# Patient Record
Sex: FEMALE | Race: WHITE | Employment: FULL TIME | URBAN - METROPOLITAN AREA
[De-identification: names, ages, dates, MRNs, and addresses within clinical notes are randomized per-mention and may not be internally consistent; named-entity substitution may affect disease eponyms.]

---

## 2019-07-19 PROBLEM — F33.2 DEPRESSION, MAJOR, SEVERE RECURRENCE (HCC): Status: ACTIVE | Noted: 2019-07-19

## 2020-06-15 PROBLEM — O09.529 ANTEPARTUM MULTIGRAVIDA OF ADVANCED MATERNAL AGE: Status: ACTIVE | Noted: 2020-06-15

## 2020-07-16 PROBLEM — F33.2 DEPRESSION, MAJOR, SEVERE RECURRENCE (HCC): Status: RESOLVED | Noted: 2019-07-19 | Resolved: 2020-07-16

## 2020-07-20 PROBLEM — O09.811 PREGNANCY RESULTING FROM ASSISTED REPRODUCTIVE TECHNOLOGY IN FIRST TRIMESTER: Status: ACTIVE | Noted: 2020-07-20

## 2020-09-02 PROBLEM — O09.812 PREGNANCY RESULTING FROM ASSISTED REPRODUCTIVE TECHNOLOGY IN SECOND TRIMESTER: Status: ACTIVE | Noted: 2020-07-20

## 2020-11-07 PROBLEM — O24.410 DIET CONTROLLED WHITE CLASSIFICATION A1 GESTATIONAL DIABETES MELLITUS (GDM): Status: ACTIVE | Noted: 2020-11-07

## 2020-11-16 ENCOUNTER — HOSPITAL ENCOUNTER (OUTPATIENT)
Dept: DIABETES SERVICES | Age: 35
Discharge: HOME | End: 2020-11-16
Payer: COMMERCIAL

## 2020-11-16 PROCEDURE — G0109 DIAB MANAGE TRN IND/GROUP: HCPCS

## 2020-11-16 NOTE — PROGRESS NOTES
This is a class  appointment with limited persons allowed in class due to SSOUI-72 public health emergency. Social distancing and mandatory precautions are in place and utilized. Gestational Diabetes Self-Management Support Plan    Services Provided: Pt received education on nutrition and meal planning during pregnancy. Emotional support for adjustment to diagnosis was provided. Care Plan/Recommendations:  Pt instructed to record blood sugar 4x/day and record all meals and snacks. Pt to bring information to appointments with [address] [place] [address] [facility]. Notes for Follow Up:   1. Barriers to checking blood glucose and adherence to meal plan identified: none  2. Barriers to psychological adjustment to diagnosis identified: none  3. Patient needs to be seen by [facility] ASAP due to: appointment is 11/18/20.       Brown Rajput, [address] [place] [address], LD, CDE  [facility] [address] [facility]

## 2020-12-15 PROBLEM — O09.813 PREGNANCY RESULTING FROM ASSISTED REPRODUCTIVE TECHNOLOGY IN THIRD TRIMESTER: Status: ACTIVE | Noted: 2020-07-20

## 2021-01-25 ENCOUNTER — HOSPITAL ENCOUNTER (INPATIENT)
Age: 36
LOS: 2 days | Discharge: HOME | End: 2021-01-27
Attending: OBSTETRICS & GYNECOLOGY | Admitting: OBSTETRICS & GYNECOLOGY
Payer: COMMERCIAL

## 2021-01-25 LAB
A1 MICROGLOB PLACENTAL VAG QL: NEGATIVE
ABO + RH BLD: NORMAL
BLOOD GROUP ANTIBODIES SERPL: NORMAL
CONTROL LINE PRESENT?: NORMAL
ERYTHROCYTE [DISTWIDTH] IN BLOOD BY AUTOMATED COUNT: 13.6 % (ref 11.9–14.6)
EXPIRATION DATE: NORMAL
GLUCOSE BLD STRIP.AUTO-MCNC: 105 MG/DL (ref 65–100)
GLUCOSE BLD STRIP.AUTO-MCNC: 127 MG/DL (ref 65–100)
GLUCOSE BLD STRIP.AUTO-MCNC: 81 MG/DL (ref 65–100)
HCT VFR BLD AUTO: 40.8 % (ref 35.8–46.3)
HGB BLD-MCNC: 13.4 G/DL (ref 11.7–15.4)
INTERNAL NEGATIVE CONTROL: NORMAL
KIT LOT NO.: NORMAL
MCH RBC QN AUTO: 26.9 PG (ref 26.1–32.9)
MCHC RBC AUTO-ENTMCNC: 32.8 G/DL (ref 31.4–35)
MCV RBC AUTO: 81.9 FL (ref 79.6–97.8)
NRBC # BLD: 0 K/UL (ref 0–0.2)
PLATELET # BLD AUTO: 240 K/UL (ref 150–450)
PMV BLD AUTO: 11.1 FL (ref 9.4–12.3)
RBC # BLD AUTO: 4.98 M/UL (ref 4.05–5.2)
SPECIMEN EXP DATE BLD: NORMAL
WBC # BLD AUTO: 9.2 K/UL (ref 4.3–11.1)

## 2021-01-25 PROCEDURE — 99283 EMERGENCY DEPT VISIT LOW MDM: CPT

## 2021-01-25 PROCEDURE — 74011250637 HC RX REV CODE- 250/637: Performed by: OBSTETRICS & GYNECOLOGY

## 2021-01-25 PROCEDURE — 86900 BLOOD TYPING SEROLOGIC ABO: CPT

## 2021-01-25 PROCEDURE — 85027 COMPLETE CBC AUTOMATED: CPT

## 2021-01-25 PROCEDURE — 82962 GLUCOSE BLOOD TEST: CPT

## 2021-01-25 PROCEDURE — 36415 COLL VENOUS BLD VENIPUNCTURE: CPT

## 2021-01-25 PROCEDURE — 65270000029 HC RM PRIVATE

## 2021-01-25 PROCEDURE — 84112 EVAL AMNIOTIC FLUID PROTEIN: CPT | Performed by: OBSTETRICS & GYNECOLOGY

## 2021-01-25 RX ORDER — DEXTROSE, SODIUM CHLORIDE, SODIUM LACTATE, POTASSIUM CHLORIDE, AND CALCIUM CHLORIDE 5; .6; .31; .03; .02 G/100ML; G/100ML; G/100ML; G/100ML; G/100ML
125 INJECTION, SOLUTION INTRAVENOUS CONTINUOUS
Status: DISCONTINUED | OUTPATIENT
Start: 2021-01-25 | End: 2021-01-27 | Stop reason: HOSPADM

## 2021-01-25 RX ORDER — SODIUM CHLORIDE 0.9 % (FLUSH) 0.9 %
5-40 SYRINGE (ML) INJECTION EVERY 8 HOURS
Status: DISCONTINUED | OUTPATIENT
Start: 2021-01-25 | End: 2021-01-26

## 2021-01-25 RX ORDER — ZOLPIDEM TARTRATE 5 MG/1
5 TABLET ORAL
Status: DISCONTINUED | OUTPATIENT
Start: 2021-01-25 | End: 2021-01-27 | Stop reason: HOSPADM

## 2021-01-25 RX ORDER — SODIUM CHLORIDE 0.9 % (FLUSH) 0.9 %
5-40 SYRINGE (ML) INJECTION AS NEEDED
Status: DISCONTINUED | OUTPATIENT
Start: 2021-01-25 | End: 2021-01-26

## 2021-01-25 RX ORDER — LIDOCAINE HYDROCHLORIDE 10 MG/ML
1 INJECTION INFILTRATION; PERINEURAL
Status: ACTIVE | OUTPATIENT
Start: 2021-01-25 | End: 2021-01-26

## 2021-01-25 RX ORDER — FAMOTIDINE 20 MG/1
20 TABLET, FILM COATED ORAL
Status: DISCONTINUED | OUTPATIENT
Start: 2021-01-25 | End: 2021-01-27 | Stop reason: HOSPADM

## 2021-01-25 RX ORDER — LIDOCAINE HYDROCHLORIDE 20 MG/ML
JELLY TOPICAL
Status: ACTIVE | OUTPATIENT
Start: 2021-01-25 | End: 2021-01-26

## 2021-01-25 RX ORDER — OXYTOCIN/RINGER'S LACTATE 30/500 ML
87.3 PLASTIC BAG, INJECTION (ML) INTRAVENOUS AS NEEDED
Status: DISCONTINUED | OUTPATIENT
Start: 2021-01-25 | End: 2021-01-27 | Stop reason: HOSPADM

## 2021-01-25 RX ORDER — SODIUM CHLORIDE 0.9 % (FLUSH) 0.9 %
5-40 SYRINGE (ML) INJECTION AS NEEDED
Status: DISCONTINUED | OUTPATIENT
Start: 2021-01-25 | End: 2021-01-27 | Stop reason: HOSPADM

## 2021-01-25 RX ORDER — ONDANSETRON 2 MG/ML
4 INJECTION INTRAMUSCULAR; INTRAVENOUS
Status: DISCONTINUED | OUTPATIENT
Start: 2021-01-25 | End: 2021-01-27 | Stop reason: HOSPADM

## 2021-01-25 RX ORDER — OXYTOCIN/RINGER'S LACTATE 30/500 ML
10 PLASTIC BAG, INJECTION (ML) INTRAVENOUS AS NEEDED
Status: COMPLETED | OUTPATIENT
Start: 2021-01-25 | End: 2021-01-26

## 2021-01-25 RX ORDER — MINERAL OIL
120 OIL (ML) ORAL
Status: COMPLETED | OUTPATIENT
Start: 2021-01-25 | End: 2021-01-26

## 2021-01-25 RX ORDER — BUTORPHANOL TARTRATE 2 MG/ML
1 INJECTION INTRAMUSCULAR; INTRAVENOUS
Status: DISCONTINUED | OUTPATIENT
Start: 2021-01-25 | End: 2021-01-27 | Stop reason: HOSPADM

## 2021-01-25 RX ADMIN — MISOPROSTOL 50 MCG: 100 TABLET ORAL at 23:46

## 2021-01-25 RX ADMIN — ZOLPIDEM TARTRATE 5 MG: 5 TABLET ORAL at 22:17

## 2021-01-25 RX ADMIN — MISOPROSTOL 25 MCG: 100 TABLET ORAL at 19:07

## 2021-01-25 NOTE — PROGRESS NOTES
Pt presents to [facility] with c/o SROM. Pt was sent over from the office to get Amnisure test performed.

## 2021-01-25 NOTE — H&P
Chief Complaint   Patient presents with    Leakage/Loss of Fluid       28 y.o. female  at 39w5d  weeks gestation who requests evaluation for possible srom. Had some leakage today, was seen in office and initially had negative nitrazine and was sent for [facility]. This was negative. But office slide for lillie did come back as positive. So given patient's GA and other factors will be admitted for induction tonight per d.dr velasquez. Her pregnancy issues include: ama, obesity, gdm diet controlled. Fetal movement has beennormal .    HISTORY:  OB History    Para Term  AB Living   1 0 0 0 0 0   SAB TAB Ectopic Molar Multiple Live Births   0 0 0 0 0 0      # Outcome Date GA Lbr Bao/2nd Weight Sex Delivery Anes PTL Lv   1 Current                No past surgical history on file.     Past Medical History:   Diagnosis Date    Anxiety     Cystic acne 2016    Depression 2016    Depression, major, severe recurrence ([facility] [address] 2019    Overweight (BMI 25.0-29.9) 2016    PCOS (polycystic ovarian syndrome)        No Known Allergies    Family History   Problem Relation Age of Onset    Hypertension Mother     No Known Problems Father     No Known Problems Sister     No Known Problems Brother     Cancer Maternal Grandfather     Diabetes Neg Hx     Heart Disease Neg Hx     Stroke Neg Hx        Social History     Socioeconomic History    Marital status:      Spouse name: Not on file    Number of children: Not on file    Years of education: Not on file    Highest education level: Not on file   Occupational History    Not on file   Social Needs    Financial resource strain: Not on file    Food insecurity     Worry: Not on file     Inability: Not on file    Transportation needs     Medical: Not on file     Non-medical: Not on file   Tobacco Use    Smoking status: Former Smoker     Quit date: 2017     Years since quitting: 3.6    Smokeless tobacco: Never Used   Rain Tobacco comment: one pack a month   Substance and Sexual Activity    Alcohol use: Not Currently    Drug use: No    Sexual activity: Yes     Partners: Male     Birth control/protection: None   Lifestyle    Physical activity     Days per week: Not on file     Minutes per session: Not on file    Stress: Not on file   Relationships    Social connections     Talks on phone: Not on file     Gets together: Not on file     Attends Yazidi service: Not on file     Active member of club or organization: Not on file     Attends meetings of clubs or organizations: Not on file     Relationship status: Not on file    Intimate partner violence     Fear of current or ex partner: Not on file     Emotionally abused: Not on file     Physically abused: Not on file     Forced sexual activity: Not on file   Other Topics Concern    Not on file   Social History Narrative    Lives at home , Ai Gonzalez ROS:  Negative:   negative 10 point ROS except as noted in HPI    Positive:   per hpi    PHYSICAL EXAM:  Last menstrual period 04/22/2020. The patient appears well, alert, oriented x 3. Appropriate affect. Lungs are clear. Heart RRR, no murmurs. Abdomen soft, non-tender, no rebound/guarding, normoactive bs. Fundus soft and non tender  Skin warm, dry, no rashes  Ext no edema, DTR's normal    Cervix: deferred/ was 1 cm in office. Fetal Heart Rate: Reactive  Baseline: 120 per minute  Variability: moderate  Accelerations: yes  Decelerations: none  Uterine contractions: none   I personally reviewed and interpreted the FHR tracing    Tests performed and reviwed:  Amniosure:  negative    I have personally reviewed the patient's history, prenatal record, and pertinent test results. vital sign trends, laboratory results, previous provider notes support my clinical impression. Assessment:  28 y.o. female at 39w5d  39.5 weeks with gdm A1, obesity, ama, possible srom.      Plan:  Findings and test results were discussed with patient.    Admit for cytotech induction-per dr. Sigifredo Richards By:  Teri Reza MD     January 25, 2021

## 2021-01-25 NOTE — PROGRESS NOTES
Pt admitted for cytotec induction tonight. Consents signed, NST reactive and admission completed. Pt verbalizes understanding of plan of care.

## 2021-01-26 ENCOUNTER — ANESTHESIA (OUTPATIENT)
Dept: LABOR AND DELIVERY | Age: 36
End: 2021-01-26
Payer: COMMERCIAL

## 2021-01-26 ENCOUNTER — ANESTHESIA EVENT (OUTPATIENT)
Dept: LABOR AND DELIVERY | Age: 36
End: 2021-01-26
Payer: COMMERCIAL

## 2021-01-26 LAB
ARTERIAL PATENCY WRIST A: ABNORMAL
ARTERIAL PATENCY WRIST A: ABNORMAL
BASE DEFICIT BLD-SCNC: 6 MMOL/L
BASE DEFICIT BLD-SCNC: 6 MMOL/L
BDY SITE: ABNORMAL
BDY SITE: ABNORMAL
CO2 BLD-SCNC: 23 MMOL/L
CO2 BLD-SCNC: 26 MMOL/L
COLLECT TIME,HTIME: 927
COLLECT TIME,HTIME: 927
GAS FLOW.O2 O2 DELIVERY SYS: ABNORMAL L/MIN
GAS FLOW.O2 O2 DELIVERY SYS: ABNORMAL L/MIN
GLUCOSE BLD STRIP.AUTO-MCNC: 119 MG/DL (ref 65–100)
HCO3 BLD-SCNC: 24.2 MMOL/L (ref 22–26)
HCO3 BLDV-SCNC: 21.8 MMOL/L (ref 23–28)
PCO2 BLDCO: 50 MMHG (ref 32–68)
PCO2 BLDCO: 65 MMHG (ref 32–68)
PH BLDCO: 7.18 [PH] (ref 7.15–7.38)
PH BLDCO: 7.25 [PH] (ref 7.15–7.38)
PO2 BLDCO: 14 MMHG
PO2 BLDCO: 24 MMHG
SAO2 % BLD: 11 % (ref 95–98)
SAO2 % BLDV: 34 % (ref 65–88)
SERVICE CMNT-IMP: ABNORMAL
SERVICE CMNT-IMP: ABNORMAL
SPECIMEN TYPE: ABNORMAL
SPECIMEN TYPE: ABNORMAL

## 2021-01-26 PROCEDURE — 76060000078 HC EPIDURAL ANESTHESIA

## 2021-01-26 PROCEDURE — 75410000003 HC RECOV DEL/VAG/CSECN EA 0.5 HR

## 2021-01-26 PROCEDURE — 77030018846 HC SOL IRR STRL H20 ICUM -A

## 2021-01-26 PROCEDURE — 74011250637 HC RX REV CODE- 250/637: Performed by: OBSTETRICS & GYNECOLOGY

## 2021-01-26 PROCEDURE — 82962 GLUCOSE BLOOD TEST: CPT

## 2021-01-26 PROCEDURE — 82803 BLOOD GASES ANY COMBINATION: CPT

## 2021-01-26 PROCEDURE — 75410000002 HC LABOR FEE PER 1 HR

## 2021-01-26 PROCEDURE — 74011250636 HC RX REV CODE- 250/636: Performed by: OBSTETRICS & GYNECOLOGY

## 2021-01-26 PROCEDURE — A4300 CATH IMPL VASC ACCESS PORTAL: HCPCS | Performed by: NURSE ANESTHETIST, CERTIFIED REGISTERED

## 2021-01-26 PROCEDURE — 3E0P7VZ INTRODUCTION OF HORMONE INTO FEMALE REPRODUCTIVE, VIA NATURAL OR ARTIFICIAL OPENING: ICD-10-PCS | Performed by: OBSTETRICS & GYNECOLOGY

## 2021-01-26 PROCEDURE — 74011250636 HC RX REV CODE- 250/636: Performed by: NURSE ANESTHETIST, CERTIFIED REGISTERED

## 2021-01-26 PROCEDURE — 77010026065 HC OXYGEN MINIMUM MEDICAL AIR

## 2021-01-26 PROCEDURE — 77030003028 HC SUT VCRL J&J -A

## 2021-01-26 PROCEDURE — 75410000000 HC DELIVERY VAGINAL/SINGLE

## 2021-01-26 PROCEDURE — 74011250636 HC RX REV CODE- 250/636: Performed by: REGISTERED NURSE

## 2021-01-26 PROCEDURE — 0UQGXZZ REPAIR VAGINA, EXTERNAL APPROACH: ICD-10-PCS | Performed by: OBSTETRICS & GYNECOLOGY

## 2021-01-26 PROCEDURE — 74011000250 HC RX REV CODE- 250: Performed by: STUDENT IN AN ORGANIZED HEALTH CARE EDUCATION/TRAINING PROGRAM

## 2021-01-26 PROCEDURE — 65270000029 HC RM PRIVATE

## 2021-01-26 PROCEDURE — 77030014125 HC TY EPDRL BBMI -B: Performed by: NURSE ANESTHETIST, CERTIFIED REGISTERED

## 2021-01-26 RX ORDER — ROPIVACAINE HYDROCHLORIDE 2 MG/ML
INJECTION, SOLUTION EPIDURAL; INFILTRATION; PERINEURAL AS NEEDED
Status: DISCONTINUED | OUTPATIENT
Start: 2021-01-26 | End: 2021-01-26 | Stop reason: HOSPADM

## 2021-01-26 RX ORDER — DOCUSATE SODIUM 100 MG/1
100 CAPSULE, LIQUID FILLED ORAL 2 TIMES DAILY
Status: DISCONTINUED | OUTPATIENT
Start: 2021-01-26 | End: 2021-01-27 | Stop reason: HOSPADM

## 2021-01-26 RX ORDER — NALOXONE HYDROCHLORIDE 0.4 MG/ML
0.4 INJECTION, SOLUTION INTRAMUSCULAR; INTRAVENOUS; SUBCUTANEOUS AS NEEDED
Status: DISCONTINUED | OUTPATIENT
Start: 2021-01-26 | End: 2021-01-27 | Stop reason: HOSPADM

## 2021-01-26 RX ORDER — SIMETHICONE 80 MG
80 TABLET,CHEWABLE ORAL
Status: DISCONTINUED | OUTPATIENT
Start: 2021-01-26 | End: 2021-01-27 | Stop reason: HOSPADM

## 2021-01-26 RX ORDER — ROPIVACAINE HYDROCHLORIDE 2 MG/ML
INJECTION, SOLUTION EPIDURAL; INFILTRATION; PERINEURAL
Status: DISCONTINUED | OUTPATIENT
Start: 2021-01-26 | End: 2021-01-26 | Stop reason: HOSPADM

## 2021-01-26 RX ORDER — CITALOPRAM 20 MG/1
20 TABLET, FILM COATED ORAL DAILY
Status: DISCONTINUED | OUTPATIENT
Start: 2021-01-26 | End: 2021-01-27 | Stop reason: HOSPADM

## 2021-01-26 RX ORDER — HYDROCODONE BITARTRATE AND ACETAMINOPHEN 5; 325 MG/1; MG/1
1-2 TABLET ORAL
Status: DISCONTINUED | OUTPATIENT
Start: 2021-01-26 | End: 2021-01-27 | Stop reason: HOSPADM

## 2021-01-26 RX ORDER — LIDOCAINE HYDROCHLORIDE AND EPINEPHRINE 15; 5 MG/ML; UG/ML
INJECTION, SOLUTION EPIDURAL
Status: COMPLETED | OUTPATIENT
Start: 2021-01-26 | End: 2021-01-26

## 2021-01-26 RX ORDER — DIPHENHYDRAMINE HCL 25 MG
25-50 CAPSULE ORAL
Status: DISCONTINUED | OUTPATIENT
Start: 2021-01-26 | End: 2021-01-27 | Stop reason: HOSPADM

## 2021-01-26 RX ORDER — OXYTOCIN/RINGER'S LACTATE 30/500 ML
0-20 PLASTIC BAG, INJECTION (ML) INTRAVENOUS
Status: DISCONTINUED | OUTPATIENT
Start: 2021-01-26 | End: 2021-01-27 | Stop reason: HOSPADM

## 2021-01-26 RX ORDER — FAMOTIDINE 20 MG/1
40 TABLET, FILM COATED ORAL DAILY
Status: DISCONTINUED | OUTPATIENT
Start: 2021-01-26 | End: 2021-01-27 | Stop reason: HOSPADM

## 2021-01-26 RX ORDER — IBUPROFEN 800 MG/1
800 TABLET ORAL
Status: DISCONTINUED | OUTPATIENT
Start: 2021-01-26 | End: 2021-01-27 | Stop reason: HOSPADM

## 2021-01-26 RX ORDER — ZOLPIDEM TARTRATE 5 MG/1
10 TABLET ORAL
Status: DISCONTINUED | OUTPATIENT
Start: 2021-01-26 | End: 2021-01-26 | Stop reason: SDUPTHER

## 2021-01-26 RX ADMIN — Medication 10000 MILLI-UNITS: at 09:34

## 2021-01-26 RX ADMIN — DOCUSATE SODIUM 100 MG: 100 CAPSULE ORAL at 11:59

## 2021-01-26 RX ADMIN — IBUPROFEN 800 MG: 800 TABLET ORAL at 18:47

## 2021-01-26 RX ADMIN — ROPIVACAINE HYDROCHLORIDE 8.5 ML/HR: 2 INJECTION, SOLUTION EPIDURAL; INFILTRATION at 03:08

## 2021-01-26 RX ADMIN — BUTORPHANOL TARTRATE 1 MG: 2 INJECTION, SOLUTION INTRAMUSCULAR; INTRAVENOUS at 01:42

## 2021-01-26 RX ADMIN — ROPIVACAINE HYDROCHLORIDE 6 ML: 5 INJECTION, SOLUTION EPIDURAL; INFILTRATION; PERINEURAL at 09:15

## 2021-01-26 RX ADMIN — Medication 4 ML: at 03:07

## 2021-01-26 RX ADMIN — IBUPROFEN 800 MG: 800 TABLET ORAL at 11:59

## 2021-01-26 RX ADMIN — FAMOTIDINE 40 MG: 20 TABLET, FILM COATED ORAL at 11:59

## 2021-01-26 RX ADMIN — LIDOCAINE HYDROCHLORIDE,EPINEPHRINE BITARTRATE 3 ML: 15; .005 INJECTION, SOLUTION EPIDURAL; INFILTRATION; INTRACAUDAL; PERINEURAL at 02:48

## 2021-01-26 RX ADMIN — SODIUM CHLORIDE, SODIUM LACTATE, POTASSIUM CHLORIDE, CALCIUM CHLORIDE, AND DEXTROSE MONOHYDRATE 125 ML/HR: 600; 310; 30; 20; 5 INJECTION, SOLUTION INTRAVENOUS at 03:06

## 2021-01-26 RX ADMIN — SODIUM CHLORIDE, SODIUM LACTATE, POTASSIUM CHLORIDE, AND CALCIUM CHLORIDE 500 ML: 600; 310; 30; 20 INJECTION, SOLUTION INTRAVENOUS at 02:40

## 2021-01-26 RX ADMIN — Medication 4 ML: at 03:03

## 2021-01-26 RX ADMIN — DOCUSATE SODIUM 100 MG: 100 CAPSULE ORAL at 18:47

## 2021-01-26 RX ADMIN — FENTANYL CITRATE 100 MCG: 50 INJECTION INTRAMUSCULAR; INTRAVENOUS at 09:15

## 2021-01-26 RX ADMIN — MINERAL OIL 120 ML: 1000 LIQUID ORAL at 09:17

## 2021-01-26 RX ADMIN — ZOLPIDEM TARTRATE 5 MG: 5 TABLET ORAL at 21:06

## 2021-01-26 RX ADMIN — CITALOPRAM HYDROBROMIDE 20 MG: 20 TABLET ORAL at 13:59

## 2021-01-26 NOTE — PROGRESS NOTES
Call placed to Dr Vijay Landeros, informed md of pt receiving 2 doses of cytotec, SROM, epidural, and now 9/90/-1. No new orders received.

## 2021-01-26 NOTE — L&D DELIVERY NOTE
Delivery Summary    Patient: Edilia Carranza MRN: [id number]  SSN: [id number]    YOB: 1985  Age: 28 y.o.   Sex: female       Information for the patient's :  Haritah Alvarado [[id number]]       Labor Events:    Labor: No    Steroids: None   Cervical Ripening Date/Time: 2021 7:07 PM   Cervical Ripening Type: Misoprostol   Antibiotics During Labor: No   Rupture Identifier:      Rupture Date/Time: 2021 1:30 AM   Rupture Type: SROM   Amniotic Fluid Volume: Large    Amniotic Fluid Description: Clear    Amniotic Fluid Odor: None    Induction: None       Induction Date/Time:        Indications for Induction:      Augmentation: Oxytocin   Augmentation Date/Time:      Indications for Augmentation:     Labor complications: None       Additional complications:        Delivery Events:  Indications For Episiotomy:     Episiotomy:     Perineal Laceration(s):     Repaired:     Periurethral Laceration Location:      Repaired:     Labial Laceration Location:     Repaired:     Sulcal Laceration Location:     Repaired:     Vaginal Laceration Location:     Repaired:     Cervical Laceration Location:     Repaired:     Repair Suture: Vicryl 3-0   Number of Repair Packets:     Estimated Blood Loss (ml):  ml   Quantitative Blood Loss (ml)                Delivery Date: 2021    Delivery Time: 9:27 AM  Delivery Type: Vaginal, Spontaneous  Sex:  Female    Gestational Age: 37w11d   Delivery Clinician:  Sin Marmolejo  Living Status: Living   Del[facility]          APGARS  One minute Five minutes Ten minutes   Skin color: 0   1        Heart rate: 2   2        Grimace: 2   2        Muscle tone: 2   2        Breathin   2        Totals: 8   9            Presentation: Vertex    Position:   Occiput Anterior  Resuscitation Method:  Tactile Stimulation;Suctioning-bulb     Meconium Stained:        Cord Information: 3 Vessels  Complications: Nuchal Cord Without Compressions  Cord around: right upper extremity  Delayed cord clamping? Yes  Cord clamped date/time:   Disposition of Cord Blood: Lab    Blood Gases Sent?: Yes    Placenta:  Date/Time: 2021  9:40 AM  Removal: Spontaneous      Appearance: Normal;Intact      Measurements:  Birth Weight: 6 lb 10.5 oz (3.02 kg)      Birth Length: 51 cm      Head Circumference: 36 cm      Chest Circumference: 30 cm     Abdominal Girth: Other Providers:   Fabrice Pugh A;ANGELIA CONNELLY;ASPEN PATEL;;;;;CATRACHITA STEWART;MJ POST, Obstetrician;Primary Nurse;Primary Kiel Nurse;Nicu Nurse;Neonatologist;Anesthesiologist;Crna;Scrub Tech;Charge Nurse           Group B Strep: No results found for: GRBSEXT, GRBSEXT  Information for the patient's :  Nathan Leon [[id number]]   No results found for: ABORH, PCTABR, PCTDIG, BILI, ABORHEXT, ABORH     Recent Labs     21  0950   PCO2CB 65   PO2CB 14   HCO3I 24.2   SO2I 11*   IBD 6   SPECTI ARTERIAL CORD   PHICB 7.18   ISITE CORD   IDEV OTHER   IALLEN NOT APPLICABLE      Delivery Note      No results found for: APH, APCO2, APO2, AHCO3, ABEC, ABDC, O2ST, SITE, RSCOM         No results found for: RUBELLAEXT, GRBSEXT         Procedure:  Patient became completely dilated and pushed. Episiotomy was not performed. Patient delivered head. Mouth and nares suctioned on the perineum with bulb syringe. Shoulders delivered without any evidence of dystocia. Baby delivered in the bed, was dried. The cord was clamped and cut. Cord pH and cord blood was obtained. The baby was placed on mom in a dry blanket or towel. The perineum was examined. She had a left vaginal tear repaired with 3.0 vicryl  The placenta was delivered spontaneously. It was examined. It was intact with three vessels. Mom and baby are doing well.          Naheed Craig MD  2021  10:03 AM

## 2021-01-26 NOTE — LACTATION NOTE
This note was copied from a baby's chart. Lactation visit. Mom had requested lactation. Baby born this morning. Has not yet latched. Mom GDM, baby with stable blood glucose. Baby roused to attempt to feed. Reviewed waking measures and discussed feeding cues. Suck assessed- baby tongue thrusting and disorganized, showed some mild improvement with a few minutes of suck practice. Discussed with parents, encouraged suck practice prior to latch attempt. Assisted on both breasts, football hold. Large breast, left nipple everted, right short and does dimple back with compression. No latch to either breast. Baby still only a few hours old so discussed options with mom. Mom desires to pump, wants to use own Spectra pump in room. Pump set up with full instruction on use, parts and settings. Mom prefers to pump one breast at a time. Reviewed collection of colostrum and Dad fed 1.5ml back to baby via straight syringe. Did well. Reviewed first 24 hour expectations. Watch for feeding cues. Feed on demand. Attempt at all feeds. Pump if no latch, and feed back colostrum. LC to continue to assist, may take baby some time to learn to latch well.

## 2021-01-26 NOTE — ANESTHESIA PROCEDURE NOTES
Epidural Block    Patient location during procedure: OB  Start time: 1/26/2021 2:48 AM  End time: 1/26/2021 2:58 AM  Reason for block: labor epidural  Staffing  Performed: attending   Anesthesiologist: Medardo Chung MD  Preanesthetic Checklist  Completed: patient identified, risks and benefits discussed, surgical consent, pre-op evaluation and timeout performed  Block Placement  Patient position: sitting  Prep: ChloraPrep  Sterility prep: cap, drape, gloves, hand and mask  Sedation level: no sedation  Patient monitoring: continuous pulse oximetry and heart rate  Approach: midline  Location: lumbar  Lumbar location: L3-L4  Epidural  Loss of resistance technique: saline  Guidance: landmark technique  Needle  Needle type: Tuohy   Needle gauge: 17 G  Needle length: 10 cm  Needle insertion depth: 9 cm  Catheter type: end hole  Catheter size: 19 G  Catheter at skin depth: 15 cm  Catheter securement method: clear occlusive dressing and surgical tape  Test dose: negative  Medications Administered  Lidocaine-EPINEPHrine (XYLOCAINE) 1.5 %-1:200,000 Epidural, 3 mL  Assessment  Block outcome: pain improved  Number of attempts: 1  Procedure assessment: patient tolerated procedure well with no complications

## 2021-01-26 NOTE — PROGRESS NOTES
Pt states stadol has barely helped her pain, sve done 3/80/-2, pt requesting epidural at this time. LR bolusing and anesthesia notified.

## 2021-01-26 NOTE — PROGRESS NOTES
Nutrition:  Best Practice Alert received for GDM. Diet: DIET NPO With Ice Chips    Patient is admitted for induction. Will defer assessment as per standard of care.     Electronically signed by Annalisa Venegas MS, RDN, LD 1/26/2021 at 9:26 AM  Contact: [phone]

## 2021-01-26 NOTE — LACTATION NOTE

## 2021-01-26 NOTE — PROGRESS NOTES
Joshua Knox at bedside at [address] [place] [address]. SHANON Lees at bedside at [address] pt to sitting up on bedside at 0247. Timeout completed at [phone] with MD, SHANON and myself at bedside. Test dose given at 0258. Negative reaction. Pt assisted to lying back in left tilt position. See anesthesia record for details. See vital sign flow sheet for BP. Tolerated procedure well.

## 2021-01-26 NOTE — ANESTHESIA PREPROCEDURE EVALUATION
Relevant Problems   No relevant active problems       Anesthetic History   No history of anesthetic complications            Review of Systems / Medical History  Patient summary reviewed, nursing notes reviewed and pertinent labs reviewed    Pulmonary  Within defined limits                 Neuro/Psych         Psychiatric history     Cardiovascular  Within defined limits                Exercise tolerance: >4 METS     GI/Hepatic/Renal     GERD: well controlled           Endo/Other    Diabetes (gdm)    Obesity     Other Findings   Comments: iup         Physical Exam    Airway  Mallampati: II  TM Distance: 4 - 6 cm  Neck ROM: normal range of motion   Mouth opening: Normal     Cardiovascular  Regular rate and rhythm,  S1 and S2 normal,  no murmur, click, rub, or gallop             Dental  No notable dental hx       Pulmonary  Breath sounds clear to auscultation               Abdominal         Other Findings            Anesthetic Plan    ASA: 2  Anesthesia type: epidural            Anesthetic plan and risks discussed with: Patient and Spouse

## 2021-01-26 NOTE — PROGRESS NOTES
Pt grossly ruptured in bed, large amount of clear fluid noted. Bed linens changed, sve done 2/70/-2. Pt given stadol for pain.

## 2021-01-26 NOTE — PROGRESS NOTES
Dr Grant Orlando called and was updated on SVE 9cm/90/-1. Orders received to discontinue blood sugars. GABRIELLE (acute kidney injury)

## 2021-01-27 VITALS
RESPIRATION RATE: 18 BRPM | DIASTOLIC BLOOD PRESSURE: 85 MMHG | HEART RATE: 76 BPM | SYSTOLIC BLOOD PRESSURE: 139 MMHG | TEMPERATURE: 97.7 F

## 2021-01-27 PROCEDURE — 74011250637 HC RX REV CODE- 250/637: Performed by: OBSTETRICS & GYNECOLOGY

## 2021-01-27 RX ORDER — IBUPROFEN 600 MG/1
600 TABLET ORAL
Qty: 60 TAB | Refills: 1 | Status: SHIPPED | OUTPATIENT
Start: 2021-01-27

## 2021-01-27 RX ADMIN — FAMOTIDINE 40 MG: 20 TABLET, FILM COATED ORAL at 08:37

## 2021-01-27 RX ADMIN — DOCUSATE SODIUM 100 MG: 100 CAPSULE ORAL at 08:37

## 2021-01-27 RX ADMIN — IBUPROFEN 800 MG: 800 TABLET ORAL at 08:37

## 2021-01-27 RX ADMIN — IBUPROFEN 800 MG: 800 TABLET ORAL at 02:20

## 2021-01-27 RX ADMIN — CITALOPRAM HYDROBROMIDE 20 MG: 20 TABLET ORAL at 08:37

## 2021-01-27 NOTE — LACTATION NOTE
This note was copied from a baby's chart. Individualized Feeding Plan for Breastfeeding   Lactation Services [phone]      As much as possible, hold your baby on your chest so babys bare skin is against your bare skin with a blanket covering babys back, especially 30 minutes before it is time for baby to eat. Watch for early feeding cues such as, licking lips, sucking motions, rooting, hands to mouth. Crying is a late feeding cue. Feed your baby at least 8 times in 24 hours, or more if your baby is showing feeding cues. If baby is sleepy put baby skin to skin and watch for hunger cues. To rouse baby: unwrap, undress, massage hands, feet, & back, change diaper, gently change babys position from lying to sitting. 15-20 minutes on the first breast of active breastfeeding is considered a good feeding. Good, active breastfeeding is when baby is alert, tugging the nipple, their ear may move, and you can hear swallows. Allow baby to finish the first side before changing sides. Sleeping at the breast or only brief, light sucks should not be considered a good, full breastfeed. At each feeding:  __x__1. Do Suck Practice on finger before each feeding until sucking pattern is smooth. Try using index finger. Nail down towards tongue. __x__2. Hand Express for a few minutes prior to latching to help start milk flow. __x__3. Baby needs to NURSE WELL x 15-20 minutes on at least first breast, burp and offer 2nd breast at every feeding. If no sustained latch only attempt at breast for 10 minutes. If baby does not latch on and feed well on at least one side, you should:   __x__4. Double pump for 15 minutes with breast massage and compression. Hand express for an additional 2-3 minutes per side. Pump after each feeding attempt or poor feeding, up to 8 times per day. If you are not putting baby to the breast you need to pump 8 times a day. Pump every 3 hours. __x__5.  Give baby all of the breast milk you obtain using a straight syringe or  curved syringe. If baby does NOT have enough wet and dirty diapers per day, is jaundiced/lethargic, or has significant weight loss AND you do NOT pump enough milk for each feeding (per volume listed below), formula supplementation may need to be used. Call lactation department /pediatrician if you have concerns. AVERAGE INTAKES OF COLOSTRUM BY HEALTHY  INFANTS:  Time  Day Intake (ml per feeding)  Based on 8 feedings per day. 1st 24 hrs  1 2-10 ml  24-48 hrs  2 5-15 ml  (Based on every 3 hour feed)  48-72 hrs  3 15-30 ml (0.5-1 oz)  72-96 hrs  4 30-45 ml (1-1.5oz)                          5-6       45-60 ml (1.5-2oz)                           7          60-75 ml (2-2.5oz)    By day 7, baby will need 67 ml or 60 oz at each feeding based on 8 feedings per day & babys weight. (1oz = 30ml). Total milk volume needed in 24 hours by Day 7 is 17.8 oz per day based on baby's birthweight of 6lbs 11oz. The more often baby eats, the less volume they need per feeding. If baby is eating more often than the minimum of 8 times per day, they may take less per feeding. If pumping, suggest using olive oil or coconut oil on your nipples before pumping to help reduce the friction. Use feeding plan until follow up with pediatrician. Continue to attempt at the breast for most feeds. Pump every 3 hours if no latch. Give all pumped colostrum/breastmilk at each feeding. Mom and infant are following up with [facility] and will see lactation consultant there. Outpatient services are located on the 4th floor at [facility]. Check in at the 4th floor registration desk (the same one you used when you came to have your baby).   Call for questions [phone]

## 2021-01-27 NOTE — PROGRESS NOTES
Chart reviewed - first time parent; anxiety. SW met with patient/ while social distancing w/appropriate PPE.  provided education on [facility] [place] [facility].  Family declined referral for home visit. Patient confirms experiencing anxiety in the past, but she states that her moods have been appropriate during her pregnancy. Per patient, \"I feel great! \"  Patient is currently taking Celexa and has been on this medication \"for years. \"  She plans to remain on the Celexa postpartum. Patient given informational packet on  mood & anxiety disorders (resources/education). Family denies any additional needs from  at this time. Family has 's contact information should any needs/questions arise.     RACHEL Ernandez-MICHELLE  [address] [place] [address]   [phone]

## 2021-01-27 NOTE — LACTATION NOTE
In to follow up with mom and infant prior to discharge to home. Mom stated that she has continued to offer the breast but infant has continued to be sleepy and uninterested. She pumps after offering the breast and is feeding expressed colostrum to infant. She had just expressed 2 mls. Reviewed expectations of the second night of life as well discharge information. Feeding plan given to mom and reviewed. Mom has her breast pump that she has been using and will continue to use at home. Mom and infant are following up with [facility] and will see lactation consultant there.

## 2021-01-27 NOTE — DISCHARGE INSTRUCTIONS
Patient Education        Vaginal Childbirth: Care Instructions  Overview     Vaginal birth means delivering a baby through the birth canal (vagina). During labor, the uterus tightens (contracts) regularly to thin and open the cervix and to push the baby out through the birth canal.  Your body will slowly heal in the next few weeks. It's easy to get too tired and overwhelmed during the first weeks after your baby is born. Changes in your hormones can shift your mood without warning. You may find it hard to meet the extra demands on your energy and time. Take it easy on yourself. Follow-up care is a key part of your treatment and safety. Be sure to make and go to all appointments, and call your doctor if you are having problems. It's also a good idea to know your test results and keep a list of the medicines you take. How can you care for yourself at home? Vaginal bleeding and cramps  · After delivery, you will have a bloody discharge from your vagina. This will turn pink within a week and then white or yellow after about 10 days. It may last for 2 to 4 weeks or longer, until the uterus has healed. Use pads instead of tampons until you stop bleeding. · Don't worry if you pass some blood clots, as long as they are smaller than a golf ball. If you have a tear or stitches in your vaginal area, change the pad at least every 4 hours. This will help prevent soreness and infection. · You may have cramps for the first few days after childbirth. These are normal and occur as the uterus shrinks to normal size. Take an over-the-counter pain medicine, such as acetaminophen (Tylenol), ibuprofen (Advil, Motrin), or naproxen (Aleve), for cramps. Read and follow all instructions on the label. Do not take aspirin, because it can cause more bleeding. · Do not take two or more pain medicines at the same time unless the doctor told you to. Many pain medicines have acetaminophen, which is Tylenol.  Too much acetaminophen (Tylenol) can be harmful. Stitches  · If you have stitches, they will dissolve on their own and don't need to be removed. Follow your doctor's instructions for cleaning the stitched area. · Put ice or a cold pack on your painful area for 10 to 20 minutes at a time, several times a day, for the first few days. Put a thin cloth between the ice and your skin. · Sit in a few inches of warm water (sitz bath) 3 times a day and after bowel movements. The warm water helps with pain and itching. If you don't have a tub, a warm shower might help. Breast fullness  · Your breasts may overfill (engorge) in the first few days after delivery. To help milk flow and to relieve pain, warm your breasts in the shower or by using warm, moist towels before nursing. · If you aren't nursing, don't put warmth on your breasts or touch your breasts. Wear a tight bra or sports bra and use ice until the fullness goes away. This usually takes 2 to 3 days. · Put ice or a cold pack on your breast after nursing to reduce swelling and pain. Put a thin cloth between the ice and your skin. Activity  · Eat a balanced diet. Don't try to lose weight by cutting calories. Keep taking your prenatal vitamins, or take a multivitamin. · Get as much rest as you can. Try to take naps when your baby sleeps during the day. · Get some exercise every day. But don't do any heavy exercise until your doctor says it is okay. · Wait until you are healed (about 4 to 6 weeks) before you have sexual intercourse. Your doctor will tell you when it is okay to have sex. · Talk to your doctor about birth control. You can get pregnant even before your period returns. Also, you can get pregnant while you are breastfeeding. Mental health  · It's normal to have some sadness, anxiety, sleeplessness, and mood swings after you go home. If you feel upset or hopeless for more than a few days or are having trouble doing the things you need to do, talk to your doctor.   Constipation and hemorrhoids  · Drink plenty of fluids, enough so that your urine is light yellow or clear like water. If you have kidney, heart, or liver disease and have to limit fluids, talk with your doctor before you increase the amount of fluids you drink. · Eat plenty of fiber each day. Have a bran muffin or bran cereal for breakfast. Try eating a piece of fruit for a mid-afternoon snack. · For painful, itchy hemorrhoids, put ice or a cold pack on the area several times a day for 10 minutes at a time. Follow this by putting a warm compress on the area for another 10 to 20 minutes or by sitting in a shallow, warm bath. When should you call for help? Call  911 anytime you think you may need emergency care. For example, call if:    · You have thoughts of harming yourself, your baby, or another person.     · You passed out (lost consciousness).     · You have chest pain, are short of breath, or cough up blood.     · You have a seizure. Call your doctor now or seek immediate medical care if:    · You have severe vaginal bleeding.     · You are dizzy or lightheaded, or you feel like you may faint.     · You have a fever.     · You have new or more pain in your belly or pelvis.     · You have symptoms of a blood clot in your leg (called a deep vein thrombosis), such as:  ? Pain in the calf, back of the knee, thigh, or groin. ? Redness and swelling in your leg or groin.     · You have signs of preeclampsia, such as:  ? Sudden swelling of your face, hands, or feet. ? New vision problems (such as dimness, blurring, or seeing spots). ? A severe headache. Watch closely for changes in your health, and be sure to contact your doctor if:    · Your vaginal bleeding seems to be getting heavier.     · You have new or worse vaginal discharge.     · You feel sad, anxious, or hopeless for more than a few days.     · You do not get better as expected. Where can you learn more?   Go to http://www.gray.com/  Enter D159 in the search box to learn more about \"Vaginal Childbirth: Care Instructions. \"  Current as of: February 11, 2020               Content Version: 12.6  © 0847-7063 [employer]. Care instructions adapted under license by [employer] (which disclaims liability or warranty for this information). If you have questions about a medical condition or this instruction, always ask your healthcare professional. [place] [address] any warranty or liability for your use of this information.

## 2021-01-27 NOTE — PROGRESS NOTES
0830 AM assessment as documented. Medications per MAR. ICE packs and pads provided. Pt up in room and doing well. Request discharge today. Instructed to call with needs.

## 2021-01-27 NOTE — ANESTHESIA POSTPROCEDURE EVALUATION
* No procedures listed *.    epidural    Anesthesia Post Evaluation      Multimodal analgesia: multimodal analgesia used between 6 hours prior to anesthesia start to PACU discharge  Patient location during evaluation: floor  Patient participation: complete - patient participated  Level of consciousness: awake and alert  Pain management: adequate  Airway patency: patent  Anesthetic complications: no  Cardiovascular status: acceptable  Respiratory status: acceptable  Hydration status: acceptable  Post anesthesia nausea and vomiting:  none  Final Post Anesthesia Temperature Assessment:  Hypothermia (<36 degrees C)      INITIAL Post-op Vital signs:   Vitals Value Taken Time   /73 01/26/21 2022   Temp     Pulse 75 01/26/21 2022   Resp     SpO2     Vitals shown include unvalidated device data.

## 2021-01-27 NOTE — PROGRESS NOTES
Patient is S/P vaginal delivery at 39 6/7 weeks, SROM. No complaints today. Lochia < menses. No GI/ issues. No F/C. VITALS  Patient Vitals for the past 24 hrs:   Temp Pulse Resp BP   21 0824 97.7 °F (36.5 °C) 76 18 139/85   21 2022 97.9 °F (36.6 °C) 75 16 133/73   21 1634  81  120/76   21 1331  74  135/80   21 1253 98.2 °F (36.8 °C) 65 18 132/77   21 1138  77 18 123/71   21 1123  83  125/74   21 1108  68  128/71   21 1053 99.2 °F (37.3 °C) 72 18 121/62        CV - RRR  LUNGS - CTA bilaterally  ABD - soft, approp tend, FF below umbilicus  EXT - tr edema bilaterally          Labs:  No results found for this or any previous visit (from the past 24 hour(s)). PPD #1      Pt is breast feeding. No issues or complaints today. Pt desires D/C today. Stable.   Routine PP instructions      Reymundo Dancer, MD  10:38 AM  21

## 2021-01-27 NOTE — LACTATION NOTE
Mom and baby are going home today. Continue to offer the breast without restriction. Mom's milk should be fully in over the next few days. Reviewed engorgement precautions. Hand Expression has been demoed and written hand-out reviewed. As milk comes in baby will be more alert at the breast and swallows will be more obvious. Breasts may feel softer once baby has finished nursing. Baby should be back to birth weight by 3weeks of age. And then gain on average 1 oz per day for the next 2-3 months. Reviewed babies should be exclusively breastfeeding for the first 6 months and that breastfeeding should continue after introduction of appropriate complimentary foods after 6 months. Initial output should be at least 1 wet and 1 bowel movement for each day old baby is. By day 5-7 once milk is fully in baby will consistently have 6 or more soaking wet diapers and about 4 bowel movement. Some babies have a bowel movement with every feeding and some have 1-3 large bowel movements each day. Inadequate output may indicate inadequate feedings and should be reported to your Pediatrician. Bowel habits may change as baby gets older. Encouraged follow-up at Pediatrician in 1-2 days, no later than 1 week of age. Call [facility] for any questions as needed or to set up an OP visit. OP phone calls are returned within 24 hours. Community Breastfeeding Resource List given.

## 2021-01-28 NOTE — DISCHARGE SUMMARY
Date of Admission:  2021 12:38 PM  Date of Discharge:  2021 12:54 PM    No diagnosis found. Brenna Khan 28 y.o.  presented at 39w6d in active labor. Pt had  without incident. See delivery note for all delivery details. Pt's PP course was uneventful. On day of D/C, she was ambulating well, afebrile, with lochia < menses. She was discharged home with medications as below. Pt was breast feeding on discharge. Routine PP instructions given to patient. She is to follow up with [facility] in 6 weeks for PP exam.    Discharge Medication List as of 2021 11:28 AM      START taking these medications    Details   ibuprofen (MOTRIN) 600 mg tablet Take 1 Tab by mouth every six (6) hours as needed for Pain., Normal, Disp-60 Tab, R-1         CONTINUE these medications which have NOT CHANGED    Details   citalopram (CELEXA) 20 mg tablet TAKE 1 TABLET BY MOUTH DAILY, Normal, Disp-30 Tab, R-11      cetirizine (ZyrTEC) 10 mg tablet Take  by mouth., Historical Med      fluticasone propionate (FLONASE) 50 mcg/actuation nasal spray SHAKE LIQUID AND USE 2 SPRAYS IN EACH NOSTRIL DAILY, Normal, Disp-16 g, R-1      famotidine (PEPCID) 40 mg tablet Take 1 Tab by mouth daily. Indications: gastroesophageal reflux disease, Normal, Disp-30 Tab, R-4      acetaminophen (Tylenol Extra Strength) 500 mg tablet Take  by mouth every six (6) hours as needed for Pain., Historical Med      polyethylene glycol (Miralax) 17 gram/dose powder Take 17 g by mouth daily. , Historical Med      PNV No.40-Iron Fum-FA Cmb No.1 27-1 mg tab Take  by mouth., Historical Med         STOP taking these medications       COLLAGEN Comments:   Reason for Stopping:         lancets misc Comments:   Reason for Stopping:         Blood-Glucose Meter monitoring kit Comments:   Reason for Stopping:         glucose blood VI test strips (blood glucose test) strip Comments:   Reason for Stopping:               Radha Hay MD  9:40 AM  01/28/21

## 2021-02-01 RX ORDER — ROPIVACAINE HYDROCHLORIDE 5 MG/ML
INJECTION, SOLUTION EPIDURAL; INFILTRATION; PERINEURAL AS NEEDED
Status: DISCONTINUED | OUTPATIENT
Start: 2021-01-26 | End: 2021-02-01 | Stop reason: HOSPADM

## 2021-02-01 RX ORDER — FENTANYL CITRATE 50 UG/ML
INJECTION, SOLUTION INTRAMUSCULAR; INTRAVENOUS AS NEEDED
Status: DISCONTINUED | OUTPATIENT
Start: 2021-01-26 | End: 2021-02-01 | Stop reason: HOSPADM

## 2021-02-01 NOTE — ADDENDUM NOTE
Addendum  created 02/01/21 0818 by Bre Beckett CRNA    Intraprocedure Meds edited, Orders acknowledged in Narrator

## 2022-03-19 PROBLEM — O09.529 ANTEPARTUM MULTIGRAVIDA OF ADVANCED MATERNAL AGE: Status: ACTIVE | Noted: 2020-06-15

## 2022-03-19 PROBLEM — O24.410 DIET CONTROLLED WHITE CLASSIFICATION A1 GESTATIONAL DIABETES MELLITUS (GDM): Status: ACTIVE | Noted: 2020-11-07

## 2022-03-19 PROBLEM — O09.813 PREGNANCY RESULTING FROM ASSISTED REPRODUCTIVE TECHNOLOGY IN THIRD TRIMESTER: Status: ACTIVE | Noted: 2020-07-20

## 2022-06-25 DIAGNOSIS — E28.2 PCOS (POLYCYSTIC OVARIAN SYNDROME): Primary | ICD-10-CM

## 2022-06-29 ENCOUNTER — NURSE ONLY (OUTPATIENT)
Dept: OBGYN CLINIC | Age: 37
End: 2022-06-29

## 2022-06-29 DIAGNOSIS — E28.2 PCOS (POLYCYSTIC OVARIAN SYNDROME): Primary | ICD-10-CM

## 2022-06-30 ENCOUNTER — PATIENT MESSAGE (OUTPATIENT)
Dept: OBGYN CLINIC | Age: 37
End: 2022-06-30

## 2022-06-30 LAB — PROGEST SERPL-MCNC: 0.99 NG/ML

## 2022-07-01 NOTE — TELEPHONE ENCOUNTER
From: Evelin Ogden  To: Dr. Jacquelni Page: 6/30/2022 6:56 PM EDT  Subject: Test results     So it looks like Im not ovulating. Letrozol 2.5 taken on day 5-9 is what I took last time to get pregnant with [facility]. Is that what we are going to do this time?      Thanks, Dawn Hunter

## 2022-07-01 NOTE — RESULT ENCOUNTER NOTE
Presuming this was a Cd#21 progesterone, no ovulation this month. This was discussed at last encounter. Cannot start Femara while breastfeeding and breastfeeding will also affect chances of spontaneous ovulation.

## 2022-07-05 RX ORDER — LORAZEPAM 0.5 MG/1
TABLET ORAL
Qty: 28 TABLET | OUTPATIENT
Start: 2022-07-05

## 2022-07-13 RX ORDER — LETROZOLE 2.5 MG/1
TABLET, FILM COATED ORAL
Qty: 5 TABLET | Refills: 0 | Status: SHIPPED | OUTPATIENT
Start: 2022-07-13 | End: 2022-07-18 | Stop reason: SDUPTHER

## 2022-07-13 NOTE — TELEPHONE ENCOUNTER
Femara is an Aromatase Inhibitor that acts to help cause ovulation. Risks:  5-8% chance of multiples, very small risk of ovarian hyperstimulation (abd pain, nausea/vomiting, diarrhea, enlarged ovaries, ascites, respiratory distress/hydrothorax, third spacing, coagulation abnormalities). Send Rx Femara 2.5mg x 5 days (pt can either do CD 3-7 or CD 5-9-- they're equivalent) and we will titrate up if needed after checking day #21 Progesterone levels monthly to evaluate whether or not the pt has ovulated. Have pt call the office on 1st day of her period to schedule her Cd#21 progesterone levels. Take UPT prior to starting Femara given it can cause severe birth defects if you were to be early pregnant and take this medication unknowingly.       Give Rx Prometrium 200mg PO QHS x 10 days Q month starting on or after CD#20 if no period on her own    Stay on PNV

## 2022-07-18 RX ORDER — LETROZOLE 2.5 MG/1
TABLET, FILM COATED ORAL
Qty: 5 TABLET | Refills: 1 | Status: SHIPPED | OUTPATIENT
Start: 2022-07-18

## 2022-08-05 ENCOUNTER — NURSE ONLY (OUTPATIENT)
Dept: OBGYN CLINIC | Age: 37
End: 2022-08-05

## 2022-08-05 DIAGNOSIS — E28.2 PCOS (POLYCYSTIC OVARIAN SYNDROME): Primary | ICD-10-CM

## 2022-08-05 LAB — PROGEST SERPL-MCNC: 12.7 NG/ML

## 2022-08-07 NOTE — RESULT ENCOUNTER NOTE
+ ovulation w/ Femara 2.5mg x 5 days. Check UPT if no period. If does not conceive this cycle please send refill of this same dose to be performed either on CD 3-7 or CD 5-9-- they're equivalent) and we will titrate up if needed after checking day #21 Progesterone levels monthly to evaluate whether or not the pt has ovulated. Have pt call the office on 1st day of her period to schedule her Cd#21 progesterone levels.

## 2022-08-25 ENCOUNTER — NURSE ONLY (OUTPATIENT)
Dept: OBGYN CLINIC | Age: 37
End: 2022-08-25

## 2022-08-25 DIAGNOSIS — N93.9 VAGINAL BLEEDING: Primary | ICD-10-CM

## 2022-08-26 DIAGNOSIS — N93.9 VAGINAL BLEEDING: Primary | ICD-10-CM

## 2022-08-26 LAB — HCG SERPL-ACNC: 17 MIU/ML (ref 0–6)

## 2022-08-27 ENCOUNTER — HOSPITAL ENCOUNTER (OUTPATIENT)
Dept: LAB | Age: 37
Discharge: HOME | End: 2022-08-30
Payer: COMMERCIAL

## 2022-08-27 DIAGNOSIS — N93.9 VAGINAL BLEEDING: ICD-10-CM

## 2022-08-27 LAB — HCG SERPL-ACNC: 4 MIU/ML (ref 0–6)

## 2022-08-27 PROCEDURE — 36415 COLL VENOUS BLD VENIPUNCTURE: CPT

## 2022-08-27 PROCEDURE — 84702 CHORIONIC GONADOTROPIN TEST: CPT

## 2022-08-29 NOTE — RESULT ENCOUNTER NOTE
Quantitative hCG has fallen to less than 5--consistent with very early pregnancy loss. No additional surveillance necessary. + ovulation w/ Femara 2.5mg x 5 days. Patient may have a refill of this for her next cycle-- to be performed either on CD 3-7 or CD 5-9-- they're equivalent) and we will titrate up if needed after checking day #21 Progesterone levels monthly to evaluate whether or not the pt has ovulated.         Have pt call the office on 1st day of her period to schedule her Cd#21 progesterone levels.

## 2022-11-07 RX ORDER — CITALOPRAM 20 MG/1
TABLET ORAL
Qty: 30 TABLET | Refills: 0 | Status: SHIPPED | OUTPATIENT
Start: 2022-11-07 | End: 2022-11-29 | Stop reason: SDUPTHER

## 2022-11-29 ENCOUNTER — OFFICE VISIT (OUTPATIENT)
Dept: INTERNAL MEDICINE CLINIC | Facility: CLINIC | Age: 37
End: 2022-11-29
Payer: COMMERCIAL

## 2022-11-29 VITALS
WEIGHT: 194 LBS | RESPIRATION RATE: 16 BRPM | HEIGHT: 68 IN | DIASTOLIC BLOOD PRESSURE: 68 MMHG | HEART RATE: 72 BPM | BODY MASS INDEX: 29.4 KG/M2 | SYSTOLIC BLOOD PRESSURE: 116 MMHG | OXYGEN SATURATION: 97 %

## 2022-11-29 DIAGNOSIS — Z3A.01 LESS THAN 8 WEEKS GESTATION OF PREGNANCY: ICD-10-CM

## 2022-11-29 DIAGNOSIS — Z23 NEEDS FLU SHOT: ICD-10-CM

## 2022-11-29 DIAGNOSIS — F41.9 ANXIETY DISORDER, UNSPECIFIED TYPE: Primary | ICD-10-CM

## 2022-11-29 DIAGNOSIS — Z86.32 PERSONAL HISTORY OF GESTATIONAL DIABETES: ICD-10-CM

## 2022-11-29 PROCEDURE — 99214 OFFICE O/P EST MOD 30 MIN: CPT | Performed by: NURSE PRACTITIONER

## 2022-11-29 RX ORDER — CITALOPRAM 20 MG/1
TABLET ORAL
Qty: 30 TABLET | Refills: 11 | Status: SHIPPED | OUTPATIENT
Start: 2022-11-29

## 2022-11-29 RX ORDER — LORAZEPAM 0.5 MG/1
0.5 TABLET ORAL 2 TIMES DAILY PRN
Qty: 30 TABLET | Refills: 2 | Status: SHIPPED | OUTPATIENT
Start: 2022-11-29 | End: 2022-12-29

## 2022-11-29 ASSESSMENT — PATIENT HEALTH QUESTIONNAIRE - PHQ9
SUM OF ALL RESPONSES TO PHQ QUESTIONS 1-9: 0
SUM OF ALL RESPONSES TO PHQ9 QUESTIONS 1 & 2: 0
SUM OF ALL RESPONSES TO PHQ QUESTIONS 1-9: 0
SUM OF ALL RESPONSES TO PHQ QUESTIONS 1-9: 0
1. LITTLE INTEREST OR PLEASURE IN DOING THINGS: 0
2. FEELING DOWN, DEPRESSED OR HOPELESS: 0
SUM OF ALL RESPONSES TO PHQ QUESTIONS 1-9: 0

## 2022-11-29 NOTE — PROGRESS NOTES
Kanika Velasquez (:  1985) is a 40 y.o. female,Established patient, here for evaluation of the following chief complaint(s):  Follow-up and Medication Refill (Needs refills of Celexa and Lorazepam. )         ASSESSMENT/PLAN:  1. Anxiety disorder, unspecified type  -     LORazepam (ATIVAN) 0.5 MG tablet; Take 1 tablet by mouth 2 times daily as needed for Anxiety for up to 30 days. , Disp-30 tablet, R-2Normal  2. Personal history of gestational diabetes  3. Less than 8 weeks gestation of pregnancy  4. Needs flu shot    No follow-ups on file. Recommend flu shot, we are out, she will get at GYN tomorrow with her [address] [place] [address] lab in system, a1c was normal and TSH was normal in April  F/u with gyn for suspected pregnancy  Refill celexa for anxiety and lorazepam PRN  F/u 1 year or as needed    Subjective   SUBJECTIVE/OBJECTIVE:  Patient is here for follow up. She recently was pregnant and had a miscarriage, but then promptly was pregnant again. Her last period was . She thinks she miscarried at that time, then she had a positive pregnancy test last 22 when she was 10 days late for a period. She has GYN appt tomorrow for ultrasound to confirm. Medication Refill      Review of Systems       Objective   Physical Exam  Constitutional:       Appearance: Normal appearance. HENT:      Head: Normocephalic. Right Ear: External ear normal.      Left Ear: External ear normal.   Eyes:      Extraocular Movements: Extraocular movements intact. Pupils: Pupils are equal, round, and reactive to light. Cardiovascular:      Rate and Rhythm: Normal rate and regular rhythm. Pulmonary:      Effort: Pulmonary effort is normal.      Breath sounds: Normal breath sounds. Musculoskeletal:      Cervical back: Neck supple. Neurological:      General: No focal deficit present. Mental Status: She is alert and oriented to person, place, and time.    Psychiatric:         Mood and Affect: Mood normal.         Behavior: Behavior normal.                An electronic signature was used to authenticate this note.     --LUKE Mann - CNP

## 2022-11-30 ENCOUNTER — OFFICE VISIT (OUTPATIENT)
Dept: OBGYN CLINIC | Age: 37
End: 2022-11-30
Payer: COMMERCIAL

## 2022-11-30 VITALS — SYSTOLIC BLOOD PRESSURE: 120 MMHG | DIASTOLIC BLOOD PRESSURE: 80 MMHG | WEIGHT: 196 LBS | BODY MASS INDEX: 29.8 KG/M2

## 2022-11-30 DIAGNOSIS — F32.A DEPRESSION AFFECTING PREGNANCY: ICD-10-CM

## 2022-11-30 DIAGNOSIS — O09.521 AMA (ADVANCED MATERNAL AGE) MULTIGRAVIDA 35+, FIRST TRIMESTER: ICD-10-CM

## 2022-11-30 DIAGNOSIS — E28.2 PCOS (POLYCYSTIC OVARIAN SYNDROME): ICD-10-CM

## 2022-11-30 DIAGNOSIS — Z86.32 HISTORY OF GESTATIONAL DIABETES: ICD-10-CM

## 2022-11-30 DIAGNOSIS — E28.2 POLYCYSTIC OVARY AFFECTING PREGNANCY, ANTEPARTUM: ICD-10-CM

## 2022-11-30 DIAGNOSIS — O99.340 DEPRESSION AFFECTING PREGNANCY: ICD-10-CM

## 2022-11-30 DIAGNOSIS — N92.6 MISSED MENSES: Primary | ICD-10-CM

## 2022-11-30 DIAGNOSIS — O09.529 ANTEPARTUM MULTIGRAVIDA OF ADVANCED MATERNAL AGE: ICD-10-CM

## 2022-11-30 DIAGNOSIS — O34.80 POLYCYSTIC OVARY AFFECTING PREGNANCY, ANTEPARTUM: ICD-10-CM

## 2022-11-30 PROCEDURE — 99215 OFFICE O/P EST HI 40 MIN: CPT | Performed by: OBSTETRICS & GYNECOLOGY

## 2022-11-30 PROCEDURE — 76830 TRANSVAGINAL US NON-OB: CPT | Performed by: OBSTETRICS & GYNECOLOGY

## 2022-11-30 RX ORDER — ONDANSETRON 4 MG/1
4 TABLET, ORALLY DISINTEGRATING ORAL 3 TIMES DAILY PRN
Qty: 21 TABLET | Refills: 0 | Status: SHIPPED | OUTPATIENT
Start: 2022-11-30

## 2022-11-30 NOTE — PROGRESS NOTES
CC:  Missed Period      HPI:  40 y.o. Connie Jaeger presents for pregnancy confirmation and establish SACHIN. Patient's last menstrual period was 10/02/2022 (approximate). ,   sure, regular cycles. moderate n/v-- taking preg pops w/ B6 ; Rx Zofran 8mg ODT given today (reports unisom makes her hallucinate)    no vb/cramping    Has been COVID 19 vaccinated x 2       OB hx:  G1  21 w/ TRINA at 39w6d (6#14); c/b A1DM, obesity, AMA, Dep/Anx  G2 Early preg loss in 2022  G3 current         BMI 28.4, PCOS, hx A1DM  G1 conceived w/ Femara x 1 cycle   Was on Femara the month prior, but conceived this pregnancy spontaneously  Hgb A1c  __        AMA (38yo):    Desires NIPT testing __   ASA 162mg discussed  MFM referral for level II DEYVI US/Echo          Dep/Anx:  Stable on Celexa 20mg   No hx pp depression   Taking Ativan rarely PRN  -- takign on avg 1x/month         Fam hx any chromosomal or inheritable disorders:   none       Her Ob history is as follows:  # 1 - Date: 21, Sex: Female, Weight: 6 lb 10.5 oz (3.02 kg), GA: 39w6d, Delivery: Vaginal, Spontaneous, Apgar1: 8, Apgar5: 9, Living: Living, Birth Comments: None    # 2 - Date: 22, Sex: None, Weight: None, GA: 4w5d, Delivery: None, Apgar1: None, Apgar5: None, Living: None, Birth Comments: None    # 3 - Date: None, Sex: None, Weight: None, GA: None, Delivery: None, Apgar1: None, Apgar5: None, Living: None, Birth Comments: None      Past medical History:    Active Ambulatory Problems     Diagnosis Date Noted    Obesity affecting pregnancy in third trimester 2016    Antepartum multigravida of advanced maternal age 06/15/2020    Polycystic ovary affecting pregnancy, antepartum     Depression affecting pregnancy 2016    History of gestational diabetes 2022    Supervision of high risk pregnancy, antepartum 2022     Resolved Ambulatory Problems     Diagnosis Date Noted    Diet controlled White classification A1 gestational diabetes mellitus (GDM) 11/07/2020    Pregnancy resulting from assisted reproductive technology in third trimester 07/20/2020     Past Medical History:   Diagnosis Date    Anxiety     Cystic acne 8/16/2016    Depression 8/16/2016    Depression, major, severe recurrence (HCC) 7/19/2019    Overweight (BMI 25.0-29.9) 9/27/2016    PCOS (polycystic ovarian syndrome)        Current Outpatient Medications   Medication Sig    ondansetron (ZOFRAN-ODT) 4 MG disintegrating tablet Take 1 tablet by mouth 3 times daily as needed for Nausea or Vomiting    Prenatal MV-Min-Fe Fum-FA-DHA (PRENATAL 1 PO) Take by mouth    citalopram (CELEXA) 20 MG tablet TAKE 1 TABLET BY MOUTH DAILY    LORazepam (ATIVAN) 0.5 MG tablet Take 1 tablet by mouth 2 times daily as needed for Anxiety for up to 30 days. letrozole (FEMARA) 2.5 MG tablet Take on cycle days 3-7 or 5-9. (Patient not taking: Reported on 11/29/2022)    acetaminophen (TYLENOL) 500 MG tablet Take by mouth every 6 hours as needed (Patient not taking: Reported on 11/29/2022)    cetirizine (ZYRTEC) 10 MG tablet Take by mouth (Patient not taking: Reported on 11/29/2022)     No current facility-administered medications for this visit. Past Surgical:  has no past surgical history on file. Allergies   Allergen Reactions    Unisom Sleeptabs [Doxylamine Succinate (Sleep)] Hallucinations         Current Outpatient Medications on File Prior to Visit   Medication Sig Dispense Refill    Prenatal MV-Min-Fe Fum-FA-DHA (PRENATAL 1 PO) Take by mouth      citalopram (CELEXA) 20 MG tablet TAKE 1 TABLET BY MOUTH DAILY 30 tablet 11    LORazepam (ATIVAN) 0.5 MG tablet Take 1 tablet by mouth 2 times daily as needed for Anxiety for up to 30 days. 30 tablet 2    letrozole (FEMARA) 2.5 MG tablet Take on cycle days 3-7 or 5-9.  (Patient not taking: Reported on 11/29/2022) 5 tablet 1    acetaminophen (TYLENOL) 500 MG tablet Take by mouth every 6 hours as needed (Patient not taking: Reported on 11/29/2022) cetirizine (ZYRTEC) 10 MG tablet Take by mouth (Patient not taking: Reported on 11/29/2022)       No current facility-administered medications on file prior to visit. GYN hx:     Hx STDs:  None     Last Pap:  Neg Cotesting 7/14/2020  Hx Abnl Pap: never     Hx STDs:  None          Nila  reports that she quit smoking about 5 years ago. Her smoking use included cigarettes. She has never used smokeless tobacco. She reports that she does not currently use alcohol. She reports that she does not use drugs. /80   Wt 196 lb (88.9 kg)   LMP 10/02/2022 (Approximate)   BMI 29.80 kg/m²     Physical Exam:  Constitutional: She appears well-developed and well-nourished. No distress. HENT:    Head: Normocephalic and atraumatic. Cardiovascular: Regular pulse   Pulmonary/Chest: Effort normal  Skin: She is not diaphoretic. Psychiatric: She has a normal mood and affect. Her behavior is normal. Thought content normal. .         Pelvic US today:  LMP 10/02/2022 SACHIN(LMP) 07/09/2023 GA(LMP) 8w3d  GA(AUA) 8w1d  SACHIN(AUA) 07/11/2023    RV UTERUS  + IUP WITH + FHM  + YOLK SAC  CRL= 8.1WKS  SMALL [address] TO THE RT OF THE GS MEASURES 1.3 X 0.5 X 0.5CM. RT OV- CL CYST  LT OV- APPEARS WNL  WNL                Counseling:  -Continue PNV with at least 205ARC Folic acid QD (4mg if previous pregnancy complicated by a fetal NTD)  -B6/Unisom (Diclegis) if nausea/vomitin g  -Calcium:  recommend 1000mg/QD  (500 in 2 Tums), milk, cheese, yogurt, leafy green vegetables  -Iron:  30mg every day (red meat, dark beans)  -Counseled on appropriate foods to avoid in pregnancy:   -unpasteurized milk/cheeses   -hot dogs, luncheon meats, cold cuts unless heated until steaming    -refrigerated richardson and meat spreads   -raw/undercooked seafood, eggs, meat  -Avoid litter box if have cat(s)   -Genetic screening options discussed          Patient counseled face to face for more than 50% of the total time spent with the patient.   On this date I have spent 44 minutes reviewing previous notes, test results, and face to face with the patient discussing the diagnosis and importance of compliance with the treatment plan as well as documenting.           Assessment/Plan:    40 y.o.  at [address] by d=8wk US with  IUP:    1) Routine pregnancy:  -PN labs today  -FU w/ RN in 1-2 wks for new pregnancy counseling   -RTO for New OB visti in 4wks   -hgb A1C  -ASA 162mg  -MFM referral         Deondre Angel MD

## 2022-12-01 PROBLEM — O24.410 DIET CONTROLLED WHITE CLASSIFICATION A1 GESTATIONAL DIABETES MELLITUS (GDM): Status: RESOLVED | Noted: 2020-11-07 | Resolved: 2022-12-01

## 2022-12-01 PROBLEM — O09.813 PREGNANCY RESULTING FROM ASSISTED REPRODUCTIVE TECHNOLOGY IN THIRD TRIMESTER: Status: RESOLVED | Noted: 2020-07-20 | Resolved: 2022-12-01

## 2022-12-01 PROBLEM — O09.90 SUPERVISION OF HIGH RISK PREGNANCY, ANTEPARTUM: Status: ACTIVE | Noted: 2022-12-01

## 2022-12-01 PROBLEM — Z86.32 HISTORY OF GESTATIONAL DIABETES: Status: ACTIVE | Noted: 2022-12-01

## 2022-12-01 LAB
ABO + RH BLD: NORMAL
BASOPHILS # BLD: 0.1 K/UL (ref 0–0.2)
BASOPHILS NFR BLD: 1 % (ref 0–2)
BLOOD GROUP ANTIBODIES SERPL: NORMAL
DIFFERENTIAL METHOD BLD: ABNORMAL
EOSINOPHIL # BLD: 0 K/UL (ref 0–0.8)
EOSINOPHIL NFR BLD: 0 % (ref 0.5–7.8)
ERYTHROCYTE [DISTWIDTH] IN BLOOD BY AUTOMATED COUNT: 12.7 % (ref 11.9–14.6)
EST. AVERAGE GLUCOSE BLD GHB EST-MCNC: 114 MG/DL
HBA1C MFR BLD: 5.6 % (ref 4.8–5.6)
HBV SURFACE AG SER QL: NONREACTIVE
HCT VFR BLD AUTO: 39.4 % (ref 35.8–46.3)
HCV AB SER QL: NONREACTIVE
HGB BLD-MCNC: 12.5 G/DL (ref 11.7–15.4)
HIV 1+2 AB+HIV1 P24 AG SERPL QL IA: NONREACTIVE
HIV 1/2 RESULT COMMENT: NORMAL
IMM GRANULOCYTES # BLD AUTO: 0 K/UL (ref 0–0.5)
IMM GRANULOCYTES NFR BLD AUTO: 0 % (ref 0–5)
LYMPHOCYTES # BLD: 2.2 K/UL (ref 0.5–4.6)
LYMPHOCYTES NFR BLD: 19 % (ref 13–44)
MCH RBC QN AUTO: 26.8 PG (ref 26.1–32.9)
MCHC RBC AUTO-ENTMCNC: 31.7 G/DL (ref 31.4–35)
MCV RBC AUTO: 84.5 FL (ref 82–102)
MONOCYTES # BLD: 0.7 K/UL (ref 0.1–1.3)
MONOCYTES NFR BLD: 6 % (ref 4–12)
NEUTS SEG # BLD: 8.5 K/UL (ref 1.7–8.2)
NEUTS SEG NFR BLD: 74 % (ref 43–78)
NRBC # BLD: 0 K/UL (ref 0–0.2)
PLATELET # BLD AUTO: 365 K/UL (ref 150–450)
PMV BLD AUTO: 9.9 FL (ref 9.4–12.3)
RBC # BLD AUTO: 4.66 M/UL (ref 4.05–5.2)
RPR SER QL: NONREACTIVE
RUBV IGG SERPL IA-ACNC: 73.7 IU/ML (ref 0–50)
WBC # BLD AUTO: 11.5 K/UL (ref 4.3–11.1)

## 2022-12-03 LAB
BACTERIA SPEC CULT: NORMAL
SERVICE CMNT-IMP: NORMAL

## 2022-12-07 ENCOUNTER — NURSE ONLY (OUTPATIENT)
Dept: OBGYN CLINIC | Age: 37
End: 2022-12-07

## 2022-12-07 DIAGNOSIS — O09.529 ANTEPARTUM MULTIGRAVIDA OF ADVANCED MATERNAL AGE: Primary | ICD-10-CM

## 2022-12-07 RX ORDER — CITALOPRAM 20 MG/1
TABLET ORAL
Qty: 30 TABLET | Refills: 11 | OUTPATIENT
Start: 2022-12-07

## 2022-12-07 NOTE — PROGRESS NOTES
NOB consult with pt. Labs today: none. Offered pt the option of CF, SMA, and Fragile X carrier testing. Pt desires testing. Offered pt the option of genetic screening (1st screen vs Tetra vs NIPT). Pt desires NIPT. Instructed pt on exercise/nutrition in pregnancy. Reviewed [facility] preg book. Advised pt on using [facility] for hospital needs and [facility] L&D for pregnancy related emergencies. Pt states understanding. NOB forms signed, scanned, and given to pt. Medical Hx: Anxiety/Depression. Cystic acne. PCOS. Surgical Hx: none    Last Pap: 20 WNL. Pt notified pap smear and STD screening will be done at . [address]. Pt OB c/o: none    Fam hx any chromosomal or inheritable disorders: none     COVID Vaccine: x2 per pt    Flu Vaccine: pt desires, will do at [facility]. OB hx:  G1  21 w/ TRINA at 39w6d (6#14); c/b A1DM, obesity, AMA, Dep/Anx    G2 Early preg loss in 2022    G3 current    *No hx GHTN/PreE. Pt denies any other pregnancy/post partum complications. [facility]: Desi Kim on 22 with . [facility] on 1/3/23. NIPT and Carrier Screen to be collected at . [address]. Pt <10 weeks today. Hemoglobin Fraction to be collected at [facility] w/ other labs. Future order placed.

## 2022-12-09 ENCOUNTER — TELEPHONE (OUTPATIENT)
Dept: OBGYN CLINIC | Age: 37
End: 2022-12-09

## 2022-12-09 RX ORDER — ASPIRIN 81 MG/1
162 TABLET ORAL DAILY
Qty: 90 TABLET | Refills: 4 | Status: SHIPPED | OUTPATIENT
Start: 2022-12-09

## 2022-12-09 NOTE — TELEPHONE ENCOUNTER
Pt called regarding baby aspirin rx. Advised pt she can start taking  mg at 12 weeks gestation. Pt can buy OTC or can send rx to pharmacy. Pt request rx. Rx sent and pt instructed to wait to start medication until 12 gestation. Pt voiced understanding.

## 2022-12-22 ENCOUNTER — ROUTINE PRENATAL (OUTPATIENT)
Dept: OBGYN CLINIC | Age: 37
End: 2022-12-22

## 2022-12-22 VITALS — DIASTOLIC BLOOD PRESSURE: 76 MMHG | BODY MASS INDEX: 29.8 KG/M2 | SYSTOLIC BLOOD PRESSURE: 138 MMHG | WEIGHT: 196 LBS

## 2022-12-22 DIAGNOSIS — O09.529 ANTEPARTUM MULTIGRAVIDA OF ADVANCED MATERNAL AGE: ICD-10-CM

## 2022-12-22 DIAGNOSIS — E28.2 POLYCYSTIC OVARY AFFECTING PREGNANCY, ANTEPARTUM: ICD-10-CM

## 2022-12-22 DIAGNOSIS — F32.A ANXIETY AND DEPRESSION: ICD-10-CM

## 2022-12-22 DIAGNOSIS — Z11.3 SCREEN FOR STD (SEXUALLY TRANSMITTED DISEASE): ICD-10-CM

## 2022-12-22 DIAGNOSIS — F41.9 ANXIETY AND DEPRESSION: ICD-10-CM

## 2022-12-22 DIAGNOSIS — Z23 FLU VACCINE NEED: ICD-10-CM

## 2022-12-22 DIAGNOSIS — Z12.4 SCREENING FOR CERVICAL CANCER: ICD-10-CM

## 2022-12-22 DIAGNOSIS — O09.90 SUPERVISION OF HIGH RISK PREGNANCY, ANTEPARTUM: Primary | ICD-10-CM

## 2022-12-22 DIAGNOSIS — O34.80 POLYCYSTIC OVARY AFFECTING PREGNANCY, ANTEPARTUM: ICD-10-CM

## 2022-12-22 DIAGNOSIS — Z86.32 HISTORY OF GESTATIONAL DIABETES: ICD-10-CM

## 2022-12-22 RX ORDER — DOCUSATE SODIUM 100 MG/1
100 CAPSULE, LIQUID FILLED ORAL 2 TIMES DAILY
Qty: 60 CAPSULE | Refills: 0 | Status: SHIPPED | OUTPATIENT
Start: 2022-12-22 | End: 2023-01-21

## 2022-12-22 RX ORDER — POLYETHYLENE GLYCOL 3350 17 G/17G
17 POWDER, FOR SOLUTION ORAL DAILY
Qty: 1530 G | Refills: 1 | Status: SHIPPED | OUTPATIENT
Start: 2022-12-22 | End: 2023-01-21

## 2022-12-22 RX ORDER — ASPIRIN 81 MG/1
162 TABLET ORAL DAILY
Qty: 90 TABLET | Refills: 3 | Status: SHIPPED | OUTPATIENT
Start: 2022-12-22

## 2022-12-22 NOTE — PROGRESS NOTES
CC: New OB exam    HPI:  40 y.o.  Viky Guillen presents today for a New OB examination at 11w4d by d=8wk US. Pt also with complaints of nothing. mild n/v--much improved with Zofran; + constipation--OTC MiraLAX, Colace, etc discussed  no cramping/VB    Genetics: desires NIPT and Carrier Screening today along w/ hgb Electrophoresis___       COVID Vaccine: x2 per pt     Flu Vaccine: pt desires today -- given today in office           OB hx:  G1  21 w/ TRINA at 39w6d (6#14); c/b A1DM, obesity, AMA, Dep/Anx (\"[place]\")  G2 Early preg loss in 2022  G3 current            BMI 28.4, PCOS, hx A1DM  W/ G1 conceived w/ Femara x 1 cycle   Was on Femara the month prior, but conceived this pregnancy spontaneously  Hgb A1c 5.6--> early 2hr GTT at 16-18wks ___            AMA (36yo):    NIPT testing today__   ASA 162mg discussed  MFM referral for level II DEYVI US/Echo-- sees 1/3/22             Dep/Anx:  Stable on Celexa 20mg   No hx pp depression   Taking Ativan rarely PRN  -- taking on avg 1x/month           Fam hx any chromosomal or inheritable disorders:   none       OB HISTORY:   OB History          3    Para   1    Term   1       0    AB   1    Living   1         SAB   1    IAB        Ectopic        Molar        Multiple        Live Births   1                Hx STDs:  None     Last Pap:  Neg Cotesting 2020  Hx Abnl Pap: never     Hx STDs:  None            Past Medical History:   Diagnosis Date    Anxiety     Cystic acne 2016    Depression 2016    Depression, major, severe recurrence ([facility] [address] 2019    Overweight (BMI 25.0-29.9) 2016    PCOS (polycystic ovarian syndrome)          No past surgical history on file.       Outpatient Encounter Medications as of 2022   Medication Sig Dispense Refill    aspirin EC 81 MG EC tablet Take 2 tablets by mouth daily 90 tablet 3    docusate sodium (COLACE) 100 MG capsule Take 1 capsule by mouth 2 times daily 60 capsule 0    polyethylene glycol (GLYCOLAX) 17 GM/SCOOP powder Take 17 g by mouth daily 1530 g 1    aspirin EC 81 MG EC tablet Take 2 tablets by mouth daily Starting at 12 weeks gestation of pregnancy 90 tablet 4    ondansetron (ZOFRAN-ODT) 4 MG disintegrating tablet Take 1 tablet by mouth 3 times daily as needed for Nausea or Vomiting 21 tablet 0    Prenatal MV-Min-Fe Fum-FA-DHA (PRENATAL 1 PO) Take by mouth      citalopram (CELEXA) 20 MG tablet TAKE 1 TABLET BY MOUTH DAILY 30 tablet 11    LORazepam (ATIVAN) 0.5 MG tablet Take 1 tablet by mouth 2 times daily as needed for Anxiety for up to 30 days. 30 tablet 2    acetaminophen (TYLENOL) 500 MG tablet Take by mouth every 6 hours as needed      cetirizine (ZYRTEC) 10 MG tablet Take by mouth       No facility-administered encounter medications on file as of 2022. Allergies   Allergen Reactions    Unisom Sleeptabs [Doxylamine Succinate (Sleep)] Hallucinations         Family History   Problem Relation Age of Onset    No Known Problems Sister     No Known Problems Brother     Cancer Maternal Grandfather     Diabetes Neg Hx     Heart Disease Neg Hx     Hypertension Mother     Stroke Neg Hx     No Known Problems Father          Social History     Socioeconomic History    Marital status:      Spouse name: None    Number of children: None    Years of education: None    Highest education level: None   Tobacco Use    Smoking status: Former     Types: Cigarettes     Quit date: 2017     Years since quittin.5    Smokeless tobacco: Never    Tobacco comments:     Quit smoking: one pack a month   Vaping Use    Vaping Use: Never used   Substance and Sexual Activity    Alcohol use: Not Currently    Drug use: No    Sexual activity: Yes     Partners: Male     Birth control/protection: None   Social History Narrative    Lives at home , [place] [address] [place]. ROS:  Review of Systems   Constitutional: Negative for chills, fever and weight loss. HENT: Negative for hearing loss.    Eyes: carrier screening, hemoglobin electrophoresis   -Flu vaccine gvien today     -RTO 4wks for ANDRES    -2hr GTT at 16-18wks    -FU w/ LEON Diop MD

## 2022-12-28 LAB
HGB A MFR BLD: 97.6 % (ref 96.4–98.8)
HGB A2 MFR BLD COLUMN CHROM: 2.4 % (ref 1.8–3.2)
HGB F MFR BLD: 0 % (ref 0–2)
HGB FRACT BLD-IMP: NORMAL
HGB S MFR BLD: 0 %

## 2023-01-03 ENCOUNTER — ROUTINE PRENATAL (OUTPATIENT)
Dept: OBGYN CLINIC | Age: 38
End: 2023-01-03
Payer: COMMERCIAL

## 2023-01-03 VITALS — HEART RATE: 86 BPM | SYSTOLIC BLOOD PRESSURE: 123 MMHG | DIASTOLIC BLOOD PRESSURE: 69 MMHG

## 2023-01-03 DIAGNOSIS — Z3A.13 13 WEEKS GESTATION OF PREGNANCY: Primary | ICD-10-CM

## 2023-01-03 DIAGNOSIS — O99.340 DEPRESSION AFFECTING PREGNANCY: ICD-10-CM

## 2023-01-03 DIAGNOSIS — O09.521 HIGH RISK PREGNANCY, MULTIGRAVIDA OF ADVANCED MATERNAL AGE IN FIRST TRIMESTER: ICD-10-CM

## 2023-01-03 DIAGNOSIS — Z86.32 HISTORY OF GESTATIONAL DIABETES: ICD-10-CM

## 2023-01-03 DIAGNOSIS — O09.90 SUPERVISION OF HIGH RISK PREGNANCY, ANTEPARTUM: ICD-10-CM

## 2023-01-03 DIAGNOSIS — E28.2 POLYCYSTIC OVARY AFFECTING PREGNANCY, ANTEPARTUM: ICD-10-CM

## 2023-01-03 DIAGNOSIS — F32.A DEPRESSION AFFECTING PREGNANCY: ICD-10-CM

## 2023-01-03 DIAGNOSIS — O09.529 ANTEPARTUM MULTIGRAVIDA OF ADVANCED MATERNAL AGE: ICD-10-CM

## 2023-01-03 DIAGNOSIS — O34.80 POLYCYSTIC OVARY AFFECTING PREGNANCY, ANTEPARTUM: ICD-10-CM

## 2023-01-03 PROCEDURE — 96127 BRIEF EMOTIONAL/BEHAV ASSMT: CPT | Performed by: OBSTETRICS & GYNECOLOGY

## 2023-01-03 PROCEDURE — 76801 OB US < 14 WKS SINGLE FETUS: CPT | Performed by: OBSTETRICS & GYNECOLOGY

## 2023-01-03 PROCEDURE — 76813 OB US NUCHAL MEAS 1 GEST: CPT | Performed by: OBSTETRICS & GYNECOLOGY

## 2023-01-03 PROCEDURE — 99204 OFFICE O/P NEW MOD 45 MIN: CPT | Performed by: OBSTETRICS & GYNECOLOGY

## 2023-01-03 ASSESSMENT — PATIENT HEALTH QUESTIONNAIRE - PHQ9
SUM OF ALL RESPONSES TO PHQ QUESTIONS 1-9: 0
2. FEELING DOWN, DEPRESSED OR HOPELESS: 0
SUM OF ALL RESPONSES TO PHQ QUESTIONS 1-9: 0
SUM OF ALL RESPONSES TO PHQ9 QUESTIONS 1 & 2: 0
SUM OF ALL RESPONSES TO PHQ QUESTIONS 1-9: 0
SUM OF ALL RESPONSES TO PHQ QUESTIONS 1-9: 0
1. LITTLE INTEREST OR PLEASURE IN DOING THINGS: 0

## 2023-01-03 NOTE — PROGRESS NOTES
MFM Consultation    Prakash Reeves (: 1985) is a 40 y.o. Kevin Bosworth at 13w2d with 2023, by Last Menstrual Period. Presents for evaluation of the following chief complaint(s):  Ultrasound and High Risk Pregnancy (AMA, Hx GDM, PCOS)  Patient is not working outside of home     Scheduled to see primary OB [facility]) on 2022. No HAs, edema. Denies preeclamptic symptoms. Reports good fetal movement. No bleeding, LOF, cramping, ctxs, or vaginal pressure. History reviewed and updated as needed. Review of Systems - per HPI; otherwise unremarkable. Exam:     Vitals:    23 0842   BP: 123/69   Pulse: 86     Recent Mood:  Mood concerns: well  Recent Labs Reviewed. Please see formal ultrasound report under imaging tab. ASSESSMENT/PLAN:  Patient Active Problem List    Diagnosis Date Noted    High risk pregnancy, multigravida of advanced maternal age 2023     Priority: High     OB hx:  G1  21 w/ TRINA at 39w6d (6#14); c/b A1DM, obesity, AMA, Dep/Anx  G2 Early preg loss in 2022  G3 current   23 [facility]: Normal NT and Nasal Bone. NIPT Negative, Male Fetus reviewed plan of care. Follow up MFM in 7 weeks for anatomy and echo. History of gestational diabetes 2022     Priority: Medium     BMI 28.4 (PreGravid), PCOS, hx A1DM:  G1 conceived w/ Femara x 1 cycle   Was on Femara the month prior, but conceived this pregnancy spontaneously  Hgb A1C 5.6 --> needs early 2hr GTT around 16-18wks___   23 [facility]: patient states she has done some glucose testing and all WDL. Will get early GTT at 18 weeks.       Polycystic ovary affecting pregnancy, antepartum      Priority: Low     See hx GDM overview         Obesity affecting pregnancy in first trimester 2016     Priority: Low     SEE GDM OVERVIEW        Depression affecting pregnancy 2016     Priority: Low      Dep/Anx:  Stable on Celexa 20mg   No hx pp depression   Taking Ativan rarely PRN  -- takign on avg 1x/month  01/03/23 [facility]: Stable mood today, doing well with Celexa 20 mg. Mood evaluated today; PHQ2 reviewed. Counseling re possibility of peripartum worsening. Mood Reassuring today      Addressed normal pregnancy complaints, reassured and offered suggestions for care  Reviewed gestational age precautions and activity goals/limitations  Nutritional counseling as well as specific goals based on current maternal and fetal status  Options for GERD therapy if becomes symptomatic over course of pregnancy  Gestational age appropriate preventive care regarding communicable disease transmission and vaccines as appropriate (including flu, TDaP, and COVID.)  Additional plans and concerns as documented in problem list.   All questions answered and concerns discussed. An electronic signature was used to authenticate this note. Taisha Morataya MD    I have spent 45 minutes reviewing previous notes, test results and face to face with the patient discussing the diagnosis and importance of compliance with the treatment plan, in addition to ultrasound findings, as well as documenting on the day of the visit (01/03/2023). Return in about 7 weeks (around 2/21/2023) for anatomy, US anatomy. Patient Active Problem List   Diagnosis Code    Obesity affecting pregnancy in first trimester O99.211    Polycystic ovary affecting pregnancy, antepartum O34.80, E28.2    Depression affecting pregnancy O99.340, F32. A    History of gestational diabetes Z80.34    High risk pregnancy, multigravida of advanced maternal age O12.46     Visit Diagnoses         Codes    13 weeks gestation of pregnancy    -  Primary Z3A.13

## 2023-01-26 ENCOUNTER — ROUTINE PRENATAL (OUTPATIENT)
Dept: OBGYN CLINIC | Age: 38
End: 2023-01-26

## 2023-01-26 VITALS — WEIGHT: 199 LBS | BODY MASS INDEX: 30.26 KG/M2 | SYSTOLIC BLOOD PRESSURE: 112 MMHG | DIASTOLIC BLOOD PRESSURE: 64 MMHG

## 2023-01-26 DIAGNOSIS — F32.A DEPRESSION AFFECTING PREGNANCY: ICD-10-CM

## 2023-01-26 DIAGNOSIS — O99.211 OBESITY AFFECTING PREGNANCY IN FIRST TRIMESTER: ICD-10-CM

## 2023-01-26 DIAGNOSIS — O09.522 HIGH RISK PREGNANCY, MULTIGRAVIDA OF ADVANCED MATERNAL AGE IN SECOND TRIMESTER: Primary | ICD-10-CM

## 2023-01-26 DIAGNOSIS — Z86.32 HISTORY OF GESTATIONAL DIABETES: ICD-10-CM

## 2023-01-26 DIAGNOSIS — O09.529 ANTEPARTUM MULTIGRAVIDA OF ADVANCED MATERNAL AGE: ICD-10-CM

## 2023-01-26 DIAGNOSIS — O34.80 POLYCYSTIC OVARY AFFECTING PREGNANCY, ANTEPARTUM: ICD-10-CM

## 2023-01-26 DIAGNOSIS — O99.340 DEPRESSION AFFECTING PREGNANCY: ICD-10-CM

## 2023-01-26 DIAGNOSIS — K21.9 GASTROESOPHAGEAL REFLUX DISEASE WITHOUT ESOPHAGITIS: ICD-10-CM

## 2023-01-26 DIAGNOSIS — E28.2 POLYCYSTIC OVARY AFFECTING PREGNANCY, ANTEPARTUM: ICD-10-CM

## 2023-01-26 PROCEDURE — 99902 PR PRENATAL VISIT: CPT | Performed by: OBSTETRICS & GYNECOLOGY

## 2023-01-26 RX ORDER — GLUCOSAMINE HCL/CHONDROITIN SU 500-400 MG
CAPSULE ORAL
Qty: 120 STRIP | Refills: 5 | Status: SHIPPED | OUTPATIENT
Start: 2023-01-26

## 2023-01-26 RX ORDER — LANCETS 30 GAUGE
1 EACH MISCELLANEOUS DAILY
Qty: 100 EACH | Refills: 3 | Status: SHIPPED | OUTPATIENT
Start: 2023-01-26

## 2023-01-26 NOTE — PROGRESS NOTES
JUANY [id number] 16w3d  Denies LOF, VB     Neg cotesting and std testing          Early 2hr GTT today -- pt states she wasn't aware for today     States has been checking her fsbg   Fastings <90  1hr after meals:  most <120     Wants to keep doing fsbg --- Will bring log at [facility]         Genetics:  NIPT low risk male (\"[place]\"), Carrier Screening Neg       S/p COVID Vaccine: x2 p   S/p flu vaccine          OB hx:  G1  21 w/ TRINA at 39w6d (6#14); c/b A1DM, obesity, AMA, Dep/Anx (\"[place]\")  G2 Early preg loss in 2022  G3 current             AMA (36yo):    ASA 162mg discussed  MFM referral for level II DEYVI US/Echo-- sees 1/3/22      01/03/23 [facility]: Normal NT and Nasal Bone. NIPT Negative, Male Fetus reviewed plan of care. Follow up MFM in 7 weeks for anatomy and echo.              BMI 28.4, PCOS, hx A1DM  W/ G1 conceived w/ Femara x 1 cycle   Was on Femara the month prior, but conceived this pregnancy spontaneously  Hgb A1c 5.6--> early 2hr GTT deferred (checking fsbg --see above)   Will bring log at [facility]        Dep/Anx:  Stable on Celexa 20mg   No hx pp depression   Taking Ativan rarely PRN  -- taking on avg 1x/month

## 2023-02-21 ENCOUNTER — ROUTINE PRENATAL (OUTPATIENT)
Dept: OBGYN CLINIC | Age: 38
End: 2023-02-21
Payer: COMMERCIAL

## 2023-02-21 VITALS — SYSTOLIC BLOOD PRESSURE: 116 MMHG | DIASTOLIC BLOOD PRESSURE: 78 MMHG

## 2023-02-21 DIAGNOSIS — Z86.32 HISTORY OF GESTATIONAL DIABETES: Primary | ICD-10-CM

## 2023-02-21 DIAGNOSIS — O34.80 POLYCYSTIC OVARY AFFECTING PREGNANCY, ANTEPARTUM: ICD-10-CM

## 2023-02-21 DIAGNOSIS — O09.522 HIGH RISK PREGNANCY, MULTIGRAVIDA OF ADVANCED MATERNAL AGE IN SECOND TRIMESTER: ICD-10-CM

## 2023-02-21 DIAGNOSIS — O99.211 OBESITY AFFECTING PREGNANCY IN FIRST TRIMESTER: ICD-10-CM

## 2023-02-21 DIAGNOSIS — O99.340 DEPRESSION AFFECTING PREGNANCY: ICD-10-CM

## 2023-02-21 DIAGNOSIS — Z3A.20 20 WEEKS GESTATION OF PREGNANCY: ICD-10-CM

## 2023-02-21 DIAGNOSIS — F32.A DEPRESSION AFFECTING PREGNANCY: ICD-10-CM

## 2023-02-21 DIAGNOSIS — O99.619 GASTROESOPHAGEAL REFLUX IN PREGNANCY: ICD-10-CM

## 2023-02-21 DIAGNOSIS — K21.9 GASTROESOPHAGEAL REFLUX IN PREGNANCY: ICD-10-CM

## 2023-02-21 DIAGNOSIS — E28.2 POLYCYSTIC OVARY AFFECTING PREGNANCY, ANTEPARTUM: ICD-10-CM

## 2023-02-21 DIAGNOSIS — O99.212 OBESITY AFFECTING PREGNANCY IN SECOND TRIMESTER: ICD-10-CM

## 2023-02-21 DIAGNOSIS — Z13.32 ENCOUNTER FOR SCREENING FOR MATERNAL DEPRESSION: ICD-10-CM

## 2023-02-21 PROCEDURE — 99214 OFFICE O/P EST MOD 30 MIN: CPT | Performed by: OBSTETRICS & GYNECOLOGY

## 2023-02-21 PROCEDURE — 96127 BRIEF EMOTIONAL/BEHAV ASSMT: CPT | Performed by: OBSTETRICS & GYNECOLOGY

## 2023-02-21 PROCEDURE — 76825 ECHO EXAM OF FETAL HEART: CPT | Performed by: OBSTETRICS & GYNECOLOGY

## 2023-02-21 PROCEDURE — 76811 OB US DETAILED SNGL FETUS: CPT | Performed by: OBSTETRICS & GYNECOLOGY

## 2023-02-21 PROCEDURE — 93325 DOPPLER ECHO COLOR FLOW MAPG: CPT | Performed by: OBSTETRICS & GYNECOLOGY

## 2023-02-21 RX ORDER — FAMOTIDINE 40 MG/1
40 TABLET, FILM COATED ORAL DAILY
Qty: 30 TABLET | Refills: 5 | Status: SHIPPED | OUTPATIENT
Start: 2023-02-21

## 2023-02-21 ASSESSMENT — PATIENT HEALTH QUESTIONNAIRE - PHQ9
2. FEELING DOWN, DEPRESSED OR HOPELESS: 0
SUM OF ALL RESPONSES TO PHQ QUESTIONS 1-9: 0
1. LITTLE INTEREST OR PLEASURE IN DOING THINGS: 0
SUM OF ALL RESPONSES TO PHQ9 QUESTIONS 1 & 2: 0
SUM OF ALL RESPONSES TO PHQ QUESTIONS 1-9: 0

## 2023-02-21 NOTE — ASSESSMENT & PLAN NOTE
Mood is stable on Celexa 20 mg; reports no crying, so she knows she is doing well. Mood evaluated today; PHQ2 reviewed. Counseling re possibility of peripartum worsening. Mood Reassuring today  Recommend lifestyle/behavioral modifications to enhance mood (exercise, sunshine, mood apps, nutritional modifications, etc). As mood stable and depression/anxiety well-controlled, will not change medications today.

## 2023-02-21 NOTE — PROGRESS NOTES
MFM Follow-up Visit    Yoni Lopez (: 1985) is a 40 y.o. Yasmany Hooks at 20w2d with 2023, by Last Menstrual Period. Presents for evaluation of the following chief complaint(s):  High Risk Pregnancy (AMA, Hx GDM, PCOS) and Pregnancy Ultrasound (Anatomy and echo)     Patient is not working outside of home. Scheduled to see primary OB [facility]) on 2023. Daughter, Gorge Tran, present today. No HAs, edema. Denies preeclamptic symptoms. Reports good fetal movement. No bleeding, LOF, cramping, ctxs, or vaginal pressure. Has checked glc- normal; completing early GCT tomorrow. Interval history since prior appt reviewed and updated as indicated. Review of Systems - per HPI; otherwise unremarkable. Exam:     Vitals:    23 1406   BP: 116/78     Recent Mood: well    Recent Labs reviewed and addressed. Ultrasound: Please see formal ultrasound report under imaging tab. ASSESSMENT/PLAN:  Patient Active Problem List    Diagnosis Date Noted    High risk pregnancy, multigravida of advanced maternal age 2023     Priority: High     Overview Note:     OB hx:  G1  21 w/ TRINA at 39w6d (6#14); c/b A1DM, obesity, AMA, Dep/Anx  G2 Early preg loss in 2022  G3 current   23 [facility]: Normal NT and Nasal Bone. NIPT Negative, Male Fetus reviewed plan of care. 2023 at [facility]: Normal anatomy/echo limited by fetal position, AC 89%, NENA WNL, Negative NIPT/Carrier screen  Follow up MFM in 6-8 weeks for growth/repeat echo. Assessment & Plan Note:     Advanced Maternal Age (Maternal Age 28 or greater at [place])  First and Second Trimester  The risk of fetal aneuploidy based on maternal age should be reviewed with patient either with physicians or genetic counselor, or both. Testing for fetal aneuploidy can be divided into invasive and non-invasive tests. Invasive testing, which includes amniocentesis and chorionic villus sampling, is diagnostic.    Non-invasive testing of maternal blood (for either hormone levels or cell-free fetal DNA), with or without ultrasound examination, is a screening test and requires follow-up testing of patients with screen-positive results. Given the increased risk of congenital anomalies in older women, a detailed second trimester ultrasound examination to assess for significant structural anomalies (particularly cardiac defects) is recommended. Other events that occur with increased frequency with increasing maternal age include hypertensive disease. For this reason, I recommend daily 162mg ASA for early/severe preeclampsia prevention. Third Trimester  There are no large randomized trials that have examined the efficacy of routine antepartum testing in women age 28 and older, therefore there is no consensus on the management of late pregnancy for this population. One strategy is to stratify women based on their risk factors, such as age and parity, and to consider other factors that might influence risk, such as pregestational obesity and race. The risk of stillbirth increases with increasing maternal age such that women ? 36years of age have the same risk of stillbirth at 43 weeks of gestation as women in their mid-20s have at 36 weeks of gestation. For this reason, we recommend beginning testing at 37 weeks in those 36years of age and older due to stillbirth risk. Consider induction at 44 weeks of gestation for women who are ?28 years and primiparous, ?44years of age, of black race, or who have additional risk factors for stillbirth such as obesity. Women who decline induction should undergo  testing and daily kick counts until spontaneous labor, nonreassuring testing, or 41 weeks of gestation.         History of gestational diabetes 2022     Priority: Medium     Overview Note:     BMI 28.4 (PreGravid), PCOS, hx A1DM:  G1 conceived w/ Femara x 1 cycle   Was on Femara the month prior, but conceived this pregnancy spontaneously  Hgb A1C 5.6 --> needs early 2hr GTT around 16-18wks___   01/03/23 [facility]: patient states she has done some glucose testing and all WDL. Will get early GTT at 18 weeks. 1/26/23: Early 2hr GTT today -- pt states she wasn't aware for today   States has been checking her fsbg   Fastings <90  1hr after meals:  most <120   Wants to keep doing fsbg --- Will bring log at [place] [facility]        Assessment & Plan Note:      Pt reports that current readings are mostly WNL, some mild PP elevations d/t food choices. Recommend if pt passes GTT, to still consider QID testing for 1 week at 24 and 28 weeks. Pt agrees. Depression affecting pregnancy 08/16/2016     Priority: Medium     Overview Note:      Dep/Anx:  Stable on Celexa 20mg   No hx pp depression   Taking Ativan rarely PRN  -- taking on avg 1x/month             Assessment & Plan Note:      Mood is stable on Celexa 20 mg; reports no crying, so she knows she is doing well. Mood evaluated today; PHQ2 reviewed. Counseling re possibility of peripartum worsening. Mood Reassuring today  Recommend lifestyle/behavioral modifications to enhance mood (exercise, sunshine, mood apps, nutritional modifications, etc). As mood stable and depression/anxiety well-controlled, will not change medications today. Gastroesophageal reflux in pregnancy 01/26/2023     Priority: Low     Assessment & Plan Note:      Tums not currently working at this time. Pt request adjustment to Pepcid 40 mg that she used previously that was effective. Script sent. Polycystic ovary affecting pregnancy, antepartum      Priority: Low     Overview Note:     See hx GDM overview          Assessment & Plan Note:     PCOS in pregnancy may have an impact on maternal and fetal health. Most individuals with PCOS are also hyperinsulinemic and as a result insulin resistant, independent of obesity, compared with those without PCOS.  In addition, the prevalence of the metabolic syndrome in women with PCOS appears to be increased. Overall, 50 to 70 percent of those with PCOS demonstrate clinically measurable insulin resistance in vivo, above and beyond that determined by their body weight. The risk of pregnancy complications is increased in women with PCOS. The spontaneous  rate in pregnancies complicated by PCOS is 20 to 40 percent higher than the baseline in the general obstetric population. Normal pregnancy is characterized by hyperplasia of the insulin-secreting pancreatic beta cells, increased insulin secretion, and an early increase in insulin sensitivity followed by progressive insulin resistance. Insulin and insulin-like growth factors are essential for the regulation of energy metabolism, cell proliferation, tissue development, and differentiation. Maternal insulin resistance is a normal phenomenon that begins in the second trimester and peaks in the third trimester. It results from increased placental secretion of diabetogenic hormones including growth hormone, CRH (which drives the release of ACTH and cortisol), hCS, and progesterone. HCS plays a major role in maternal insulin resistance, peaking at 30 weeks of gestation. In a meta-analysis of 27 studies involving 4982 individuals with PCOS, the odds ratios of developing gestational diabetes, pregnancy-induced hypertension, pre-eclampsia, and  birth were 3.4, 3.4, 2.2, and 1.9, respectively, when compared with the general obstetric population. In addition, their babies had a higher risk of admission to the  intensive care unit (NICU) (OR 2.3). Obesity, in the absence of PCOS, is also a risk factor for these complications. Patient will be at high risk of type 2 diabetes following pregnancy. Encourage lactation to lower risk. Would benefit from nutritional/activity/lifestyle focus in pregnancy and postpartum to optimize future health.          Obesity affecting pregnancy in second trimester 2016 Priority: Low     Overview Note:              Assessment & Plan Note:     Obesity in Pregnancy  Preconception BMI ? 30 increases risk for pregnancy complications, including gestational diabetes, poor or accelerated fetal growth, hypertensive disorders of pregnancy, and abnormal labor progression. In addition, there is an increased risk of fetal demise, as well as congenital anomalies including neural tube defects, cardiac malformations, orofacial defects, and limb eduction abnormalities. The risk for stillbirth increases with increasing obesity: class I obesity 1.3 [1.2-1.4], class II obesity 1.4 [1.3-1.6], class III obesity 1.9 [1.3-1.6]) and higher stillbirth risk in  obese women (1.9 [1.7-2.1]) than in  obese women (1.4 [1.3-1.5]). Among women with class III obesity (BMI ?40 kg/m2), the risk for stillbirth increased with advancing gestational age: 27 to 29 weeks, hazard and risk ratios 1.40 and 1.69, respectively; 37 to 39 weeks, hazard and risk ratios 3.20 and 2.95, respectively; and 40 to 42 weeks, hazard and risk ratios 3.30 and 8.95, respectively. Recommend level II ultrasound for anatomy and fetal echo if prepregnancy BMI >30-35 based on AIUM guidelines. Consider  testing beginning at 32-36 weeks due to risks of fetal demise, timing dependent on maternal and fetal comorbidities. Early evaluation of insulin resistance with HgbA1c at intitation of care- if a1c > 5.5, then follow up with either \"2 step\" 1hr GCT/ 3hr GTT OR \"1 step\" 2hr GTT  Closely monitor blood pressure for development/worsening of hypertensive disorders of pregnancy. Weight Gain Goal: <15 pounds, it is ok to stay same weight or lose as long as baby growing well  Dietary choices- low carb fine, avoid extreme keto (goal >50-75gm carb/day); not to use intermittent/prolonged fasting without specific discussion with physician.    Continue activity/exercise     Patient counseled re need to optimize both maternal and fetal health by remaining active, limiting weight gain, and modifying food choices. She understands that if obesity worsens, then will need to meet with anesthesia and as a team determine safest location for delivery. 119 [employer] Guidelines for the Management of Obese Pregnant Patients  Patients who reach a BMI ? 50 or 500 pounds during pregnancy should be:   Referred to the Anesthesia Pre-Assessment area for consultation. The Anesthesia department will determine if patient needs any further evaluation and can safely receive anesthesia care at [facility].  Anesthesiologist may refer patient for further evaluation including Maternal Echo and/or Obstructive Sleep Apnea evaluation  Referred back to [facility] for consultation if they are not already following patient. Primary OB will then contact [facility] to discuss Anesthesia evaluation and recommendations and decide whether patient can be safely delivered at [facility] or needs transfer to an OB/Gyn group to be delivered at level 3 center. Addressed normal pregnancy complaints, reassured and offered suggestions for care  Reviewed gestational age precautions and activity goals/limitations  Nutritional counseling as well as specific goals based on current maternal and fetal status  Options for GERD therapy if becomes symptomatic over course of pregnancy  Gestational age appropriate preventive care regarding communicable disease transmission and vaccines as appropriate (including flu, TDaP, and COVID.)  Additional plans and concerns as documented in problem list.   All questions answered and concerns discussed. An electronic signature was used to authenticate this note.   Godfrey Serrato MD    I have spent 35 minutes reviewing previous notes, test results and face to face with the patient discussing the diagnosis and importance of compliance with the treatment plan, in addition to ultrasound findings, as well as documenting on the day of the visit (02/21/2023). Return for 6-8 weeks for growth, repeat echo. Patient Active Problem List   Diagnosis Code    Obesity affecting pregnancy in second trimester O99.212    Polycystic ovary affecting pregnancy, antepartum O34.80, E28.2    Depression affecting pregnancy O99.340, F32. A    History of gestational diabetes Z80.34    High risk pregnancy, multigravida of advanced maternal age O12.46    Gastroesophageal reflux in pregnancy O99.619, K21.9     Visit Diagnoses         Codes    20 weeks gestation of pregnancy     Z3A.20    Encounter for screening for maternal depression     Z13.32

## 2023-02-21 NOTE — ASSESSMENT & PLAN NOTE
Pt reports that current readings are mostly WNL, some mild PP elevations d/t food choices. Recommend if pt passes GTT, to still consider QID testing for 1 week at 24 and 28 weeks. Pt agrees.

## 2023-02-21 NOTE — ASSESSMENT & PLAN NOTE
Tums not currently working at this time. Pt request adjustment to Pepcid 40 mg that she used previously that was effective. Script sent.

## 2023-02-22 NOTE — ASSESSMENT & PLAN NOTE
Advanced Maternal Age (Maternal Age 28 or greater at [facility]ACHIN)  First and Second Trimester  The risk of fetal aneuploidy based on maternal age should be reviewed with patient either with physicians or genetic counselor, or both. Testing for fetal aneuploidy can be divided into invasive and non-invasive tests.  Invasive testing, which includes amniocentesis and chorionic villus sampling, is diagnostic.  Non-invasive testing of maternal blood (for either hormone levels or cell-free fetal DNA), with or without ultrasound examination, is a screening test and requires follow-up testing of patients with screen-positive results. Given the increased risk of congenital anomalies in older women, a detailed second trimester ultrasound examination to assess for significant structural anomalies (particularly cardiac defects) is recommended. Other events that occur with increased frequency with increasing maternal age include hypertensive disease. For this reason, I recommend daily 162mg ASA for early/severe preeclampsia prevention. Third Trimester  There are no large randomized trials that have examined the efficacy of routine antepartum testing in women age 28 and older, therefore there is no consensus on the management of late pregnancy for this population. One strategy is to stratify women based on their risk factors, such as age and parity, and to consider other factors that might influence risk, such as pregestational obesity and race.  The risk of stillbirth increases with increasing maternal age such that women ? 36years of age have the same risk of stillbirth at 43 weeks of gestation as women in their mid-20s have at 36 weeks of gestation. For this reason, we recommend beginning testing at 37 weeks in those 36years of age and older due to stillbirth risk.      Consider induction at 44 weeks of gestation for women who are ?28 years and primiparous, ?44years of age, of black race, or who have additional risk factors for stillbirth such as obesity. Women who decline induction should undergo  testing and daily kick counts until spontaneous labor, nonreassuring testing, or 41 weeks of gestation.

## 2023-02-22 NOTE — ASSESSMENT & PLAN NOTE
PCOS in pregnancy may have an impact on maternal and fetal health. Most individuals with PCOS are also hyperinsulinemic and as a result insulin resistant, independent of obesity, compared with those without PCOS. In addition, the prevalence of the metabolic syndrome in women with PCOS appears to be increased. Overall, 50 to 70 percent of those with PCOS demonstrate clinically measurable insulin resistance in vivo, above and beyond that determined by their body weight. The risk of pregnancy complications is increased in women with PCOS. The spontaneous  rate in pregnancies complicated by PCOS is 20 to 40 percent higher than the baseline in the general obstetric population. Normal pregnancy is characterized by hyperplasia of the insulin-secreting pancreatic beta cells, increased insulin secretion, and an early increase in insulin sensitivity followed by progressive insulin resistance. Insulin and insulin-like growth factors are essential for the regulation of energy metabolism, cell proliferation, tissue development, and differentiation. Maternal insulin resistance is a normal phenomenon that begins in the second trimester and peaks in the third trimester. It results from increased placental secretion of diabetogenic hormones including growth hormone, CRH (which drives the release of ACTH and cortisol), hCS, and progesterone. HCS plays a major role in maternal insulin resistance, peaking at 30 weeks of gestation. In a meta-analysis of 27 studies involving 4982 individuals with PCOS, the odds ratios of developing gestational diabetes, pregnancy-induced hypertension, pre-eclampsia, and  birth were 3.4, 3.4, 2.2, and 1.9, respectively, when compared with the general obstetric population. In addition, their babies had a higher risk of admission to the  intensive care unit (NICU) (OR 2.3). Obesity, in the absence of PCOS, is also a risk factor for these complications.      Patient will be at high risk of type 2 diabetes following pregnancy. Encourage lactation to lower risk. Would benefit from nutritional/activity/lifestyle focus in pregnancy and postpartum to optimize future health.

## 2023-02-22 NOTE — ASSESSMENT & PLAN NOTE
Obesity in Pregnancy  Preconception BMI ? 30 increases risk for pregnancy complications, including gestational diabetes, poor or accelerated fetal growth, hypertensive disorders of pregnancy, and abnormal labor progression. In addition, there is an increased risk of fetal demise, as well as congenital anomalies including neural tube defects, cardiac malformations, orofacial defects, and limb eduction abnormalities. The risk for stillbirth increases with increasing obesity: class I obesity 1.3 [1.2-1.4], class II obesity 1.4 [1.3-1.6], class III obesity 1.9 [1.3-1.6]) and higher stillbirth risk in  obese women (1.9 [1.7-2.1]) than in  obese women (1.4 [1.3-1.5]). Among women with class III obesity (BMI ?40 kg/m2), the risk for stillbirth increased with advancing gestational age: 27 to 29 weeks, hazard and risk ratios 1.40 and 1.69, respectively; 37 to 39 weeks, hazard and risk ratios 3.20 and 2.95, respectively; and 40 to 42 weeks, hazard and risk ratios 3.30 and 8.95, respectively. · Recommend level II ultrasound for anatomy and fetal echo if prepregnancy BMI >30-35 based on AIUM guidelines. · Consider  testing beginning at 32-36 weeks due to risks of fetal demise, timing dependent on maternal and fetal comorbidities. · Early evaluation of insulin resistance with HgbA1c at intitation of care- if a1c > 5.5, then follow up with either \"2 step\" 1hr GCT/ 3hr GTT OR \"1 step\" 2hr GTT  · Closely monitor blood pressure for development/worsening of hypertensive disorders of pregnancy. · Weight Gain Goal: <15 pounds, it is ok to stay same weight or lose as long as baby growing well  · Dietary choices- low carb fine, avoid extreme keto (goal >50-75gm carb/day); not to use intermittent/prolonged fasting without specific discussion with physician.    · Continue activity/exercise     Patient counseled re need to optimize both maternal and fetal health by remaining active, limiting weight gain, and modifying food choices. She understands that if obesity worsens, then will need to meet with anesthesia and as a team determine safest location for delivery. 119 [employer]arg Guidelines for the Management of Obese Pregnant Patients   Patients who reach a BMI ? 50 or 500 pounds during pregnancy should be:   a. Referred to the Anesthesia Pre-Assessment area for consultation. The Anesthesia department will determine if patient needs any further evaluation and can safely receive anesthesia care at [facility].  Anesthesiologist may refer patient for further evaluation including Maternal Echo and/or Obstructive Sleep Apnea evaluation  b. Referred back to [facility] for consultation if they are not already following patient. c. Primary OB will then contact [facility] to discuss Anesthesia evaluation and recommendations and decide whether patient can be safely delivered at [facility] or needs transfer to an OB/Gyn group to be delivered at level 3 center.

## 2023-02-23 ENCOUNTER — ROUTINE PRENATAL (OUTPATIENT)
Dept: OBGYN CLINIC | Age: 38
End: 2023-02-23

## 2023-02-23 VITALS — DIASTOLIC BLOOD PRESSURE: 79 MMHG | BODY MASS INDEX: 31.47 KG/M2 | SYSTOLIC BLOOD PRESSURE: 128 MMHG | WEIGHT: 207 LBS

## 2023-02-23 DIAGNOSIS — O09.522 HIGH RISK PREGNANCY, MULTIGRAVIDA OF ADVANCED MATERNAL AGE IN SECOND TRIMESTER: Primary | ICD-10-CM

## 2023-02-23 DIAGNOSIS — F32.A DEPRESSION AFFECTING PREGNANCY: ICD-10-CM

## 2023-02-23 DIAGNOSIS — E28.2 POLYCYSTIC OVARY AFFECTING PREGNANCY, ANTEPARTUM: ICD-10-CM

## 2023-02-23 DIAGNOSIS — O99.340 DEPRESSION AFFECTING PREGNANCY: ICD-10-CM

## 2023-02-23 DIAGNOSIS — O09.521 AMA (ADVANCED MATERNAL AGE) MULTIGRAVIDA 35+, FIRST TRIMESTER: ICD-10-CM

## 2023-02-23 DIAGNOSIS — O99.212 OBESITY AFFECTING PREGNANCY IN SECOND TRIMESTER: ICD-10-CM

## 2023-02-23 DIAGNOSIS — Z86.32 HISTORY OF GESTATIONAL DIABETES: ICD-10-CM

## 2023-02-23 DIAGNOSIS — O34.80 POLYCYSTIC OVARY AFFECTING PREGNANCY, ANTEPARTUM: ICD-10-CM

## 2023-02-23 PROCEDURE — 99902 PR PRENATAL VISIT: CPT | Performed by: OBSTETRICS & GYNECOLOGY

## 2023-02-23 SDOH — ECONOMIC STABILITY: FOOD INSECURITY: WITHIN THE PAST 12 MONTHS, THE FOOD YOU BOUGHT JUST DIDN'T LAST AND YOU DIDN'T HAVE MONEY TO GET MORE.: NEVER TRUE

## 2023-02-23 SDOH — ECONOMIC STABILITY: HOUSING INSECURITY
IN THE LAST 12 MONTHS, WAS THERE A TIME WHEN YOU DID NOT HAVE A STEADY PLACE TO SLEEP OR SLEPT IN A SHELTER (INCLUDING NOW)?: NO

## 2023-02-23 SDOH — ECONOMIC STABILITY: FOOD INSECURITY: WITHIN THE PAST 12 MONTHS, YOU WORRIED THAT YOUR FOOD WOULD RUN OUT BEFORE YOU GOT MONEY TO BUY MORE.: NEVER TRUE

## 2023-02-23 SDOH — ECONOMIC STABILITY: INCOME INSECURITY: HOW HARD IS IT FOR YOU TO PAY FOR THE VERY BASICS LIKE FOOD, HOUSING, MEDICAL CARE, AND HEATING?: NOT HARD AT ALL

## 2023-02-23 NOTE — PROGRESS NOTES
Meghna Veloz 20w4d  Denies LOF, VB, Ctxs. Good FM. Wanted to check FSBG instead of Early 2hr GTT    Log:   Fastings:  rare elevation   1hr after meals:  rare elevation     DC fsbg for now -- restart around 24-26ish wks          Genetics:  NIPT low risk male Jan Liu"), Carrier Screening Neg          OB hx:  G1  21 w/ TRINA at 39w6d (6#14); c/b A1DM, obesity, AMA, Dep/Anx (\"[place]\")  G2 Early preg loss in 2022  G3 current              AMA (36yo):    ASA 162mg discussed      23 [facility]: Normal NT and Nasal Bone. NIPT Negative, Male Fetus reviewed plan of care. Follow up MFM in 7 weeks for anatomy and echo.    2023 at [facility]: Normal anatomy/echo limited by fetal position, AC 89%, NENA WNL, Negative NIPT/Carrier screen  Follow up MFM in 6-8 weeks for growth/repeat echo.              BMI 28.4, PCOS, hx A1DM  W/ G1 conceived w/ Femara x 1 cycle   Was on Femara the month prior, but conceived this pregnancy spontaneously  Hgb A1c 5.6--> early 2hr GTT deferred (checking fsbg --see above)   See above        Dep/Anx:  Stable on Celexa 20mg   No hx pp depression   Taking Ativan rarely PRN  -- taking on avg 1x/month

## 2023-03-30 ENCOUNTER — ROUTINE PRENATAL (OUTPATIENT)
Dept: OBGYN CLINIC | Age: 38
End: 2023-03-30

## 2023-03-30 VITALS — BODY MASS INDEX: 32.54 KG/M2 | WEIGHT: 214 LBS | DIASTOLIC BLOOD PRESSURE: 70 MMHG | SYSTOLIC BLOOD PRESSURE: 120 MMHG

## 2023-03-30 DIAGNOSIS — O99.212 OBESITY AFFECTING PREGNANCY IN SECOND TRIMESTER: Primary | ICD-10-CM

## 2023-03-30 PROCEDURE — 99902 PR PRENATAL VISIT: CPT | Performed by: OBSTETRICS & GYNECOLOGY

## 2023-03-30 NOTE — PROGRESS NOTES
Everet Arrant 25w4d  Denies LOF, VB, Ctxs. Good FM. Glucola at NV         Wanted to check FSBG instead of Early 2hr GTT     MFM ok'd DC fsbg until now (24-26ish wks )  Pt to restart today and bring w/ her at her NV         Genetics:  NIPT low risk male Miya Alvarez"), Carrier Screening Neg        OB hx:  G1  21 w/ TRINA at 39w6d (6#14); c/b A1DM, obesity, AMA, Dep/Anx (\"[place]\")  G2 Early preg loss in 2022  G3 current              AMA (36yo):    ASA 162mg discussed      23 [facility]: Normal NT and Nasal Bone. NIPT Negative, Male Fetus reviewed plan of care. Follow up MFM in 7 weeks for anatomy and echo.    2023 at [facility]: Normal anatomy/echo limited by fetal position, AC 89%, NENA WNL, Negative NIPT/Carrier screen  Follow up MFM in 6-8 weeks for growth/repeat echo.     FU w/ MFM on  ___         BMI 28.4, PCOS, hx A1DM  W/ G1 conceived w/ Femara x 1 cycle   Was on Femara the month prior, but conceived this pregnancy spontaneously  Hgb A1c 5.6--> early 2hr GTT deferred (checking fsbg --see above)   See above        Dep/Anx:  Stable on Celexa 20mg   No hx pp depression   Taking Ativan rarely PRN  -- taking on avg 1x/month

## 2023-04-20 ENCOUNTER — ROUTINE PRENATAL (OUTPATIENT)
Dept: OBGYN CLINIC | Age: 38
End: 2023-04-20

## 2023-04-20 VITALS — BODY MASS INDEX: 33.3 KG/M2 | SYSTOLIC BLOOD PRESSURE: 120 MMHG | DIASTOLIC BLOOD PRESSURE: 80 MMHG | WEIGHT: 219 LBS

## 2023-04-20 DIAGNOSIS — F32.A DEPRESSION AFFECTING PREGNANCY: ICD-10-CM

## 2023-04-20 DIAGNOSIS — O24.415 ORAL HYPOGLYCEMIC CONTROLLED WHITE CLASSIFICATION A2 GESTATIONAL DIABETES MELLITUS (GDM): ICD-10-CM

## 2023-04-20 DIAGNOSIS — O99.212 OBESITY AFFECTING PREGNANCY IN SECOND TRIMESTER: ICD-10-CM

## 2023-04-20 DIAGNOSIS — O09.523 HIGH RISK PREGNANCY, MULTIGRAVIDA OF ADVANCED MATERNAL AGE IN THIRD TRIMESTER: Primary | ICD-10-CM

## 2023-04-20 DIAGNOSIS — O24.419 GESTATIONAL DIABETES MELLITUS (GDM) IN THIRD TRIMESTER, GESTATIONAL DIABETES METHOD OF CONTROL UNSPECIFIED: ICD-10-CM

## 2023-04-20 DIAGNOSIS — F41.9 ANXIETY AND DEPRESSION: ICD-10-CM

## 2023-04-20 DIAGNOSIS — Z13.0 SCREENING FOR IRON DEFICIENCY ANEMIA: ICD-10-CM

## 2023-04-20 DIAGNOSIS — F32.A ANXIETY AND DEPRESSION: ICD-10-CM

## 2023-04-20 DIAGNOSIS — O99.340 DEPRESSION AFFECTING PREGNANCY: ICD-10-CM

## 2023-04-20 LAB
BASOPHILS # BLD: 0 K/UL (ref 0–0.2)
BASOPHILS NFR BLD: 0 % (ref 0–2)
DIFFERENTIAL METHOD BLD: ABNORMAL
EOSINOPHIL # BLD: 0.1 K/UL (ref 0–0.8)
EOSINOPHIL NFR BLD: 1 % (ref 0.5–7.8)
ERYTHROCYTE [DISTWIDTH] IN BLOOD BY AUTOMATED COUNT: 13.2 % (ref 11.9–14.6)
HCT VFR BLD AUTO: 35.9 % (ref 35.8–46.3)
HGB BLD-MCNC: 11.3 G/DL (ref 11.7–15.4)
IMM GRANULOCYTES # BLD AUTO: 0 K/UL (ref 0–0.5)
IMM GRANULOCYTES NFR BLD AUTO: 0 % (ref 0–5)
LYMPHOCYTES # BLD: 1.6 K/UL (ref 0.5–4.6)
LYMPHOCYTES NFR BLD: 16 % (ref 13–44)
MCH RBC QN AUTO: 27 PG (ref 26.1–32.9)
MCHC RBC AUTO-ENTMCNC: 31.5 G/DL (ref 31.4–35)
MCV RBC AUTO: 85.9 FL (ref 82–102)
MONOCYTES # BLD: 0.7 K/UL (ref 0.1–1.3)
MONOCYTES NFR BLD: 6 % (ref 4–12)
NEUTS SEG # BLD: 7.9 K/UL (ref 1.7–8.2)
NEUTS SEG NFR BLD: 77 % (ref 43–78)
NRBC # BLD: 0 K/UL (ref 0–0.2)
PLATELET # BLD AUTO: 260 K/UL (ref 150–450)
PMV BLD AUTO: 10.6 FL (ref 9.4–12.3)
RBC # BLD AUTO: 4.18 M/UL (ref 4.05–5.2)
WBC # BLD AUTO: 10.4 K/UL (ref 4.3–11.1)

## 2023-04-20 RX ORDER — METFORMIN HYDROCHLORIDE 500 MG/1
500 TABLET, EXTENDED RELEASE ORAL 2 TIMES DAILY
Qty: 60 TABLET | Refills: 3 | Status: SHIPPED | OUTPATIENT
Start: 2023-04-20

## 2023-04-20 ASSESSMENT — PATIENT HEALTH QUESTIONNAIRE - PHQ9
1. LITTLE INTEREST OR PLEASURE IN DOING THINGS: 0
10. IF YOU CHECKED OFF ANY PROBLEMS, HOW DIFFICULT HAVE THESE PROBLEMS MADE IT FOR YOU TO DO YOUR WORK, TAKE CARE OF THINGS AT HOME, OR GET ALONG WITH OTHER PEOPLE: 0
SUM OF ALL RESPONSES TO PHQ QUESTIONS 1-9: 0
SUM OF ALL RESPONSES TO PHQ9 QUESTIONS 1 & 2: 0
SUM OF ALL RESPONSES TO PHQ QUESTIONS 1-9: 0
3. TROUBLE FALLING OR STAYING ASLEEP: 0
2. FEELING DOWN, DEPRESSED OR HOPELESS: 0
9. THOUGHTS THAT YOU WOULD BE BETTER OFF DEAD, OR OF HURTING YOURSELF: 0
SUM OF ALL RESPONSES TO PHQ QUESTIONS 1-9: 0
SUM OF ALL RESPONSES TO PHQ QUESTIONS 1-9: 0
7. TROUBLE CONCENTRATING ON THINGS, SUCH AS READING THE NEWSPAPER OR WATCHING TELEVISION: 0
4. FEELING TIRED OR HAVING LITTLE ENERGY: 0
6. FEELING BAD ABOUT YOURSELF - OR THAT YOU ARE A FAILURE OR HAVE LET YOURSELF OR YOUR FAMILY DOWN: 0
8. MOVING OR SPEAKING SO SLOWLY THAT OTHER PEOPLE COULD HAVE NOTICED. OR THE OPPOSITE, BEING SO FIGETY OR RESTLESS THAT YOU HAVE BEEN MOVING AROUND A LOT MORE THAN USUAL: 0
5. POOR APPETITE OR OVEREATING: 0

## 2023-04-20 NOTE — PROGRESS NOTES
Serafin Hidalgo 28w4d  Denies LOF, VB, Ctxs. Good FM. CBC today   Rh pos  Tdap counseling        Wanted to check FSBG instead of Early 2hr GTT     MFM ok'd DC fsbg until now (24-26ish wks )    States \"they're not good b/c im not sleeping\"   Stress w/ moving     Log reviewed:  Fastings most mildly elevated  1hr : slightly less than 50% elevated      FH 33wks (S>D)     Admits has not made diet changes but has ordered some meal replacement shakes and planning to start walking more     Offered option of starting low dose Metformin vs insulin vs allowing a week to make changes and reassess w/ next US  Pt desires to start Metformin 500 ER BID now   Now A2DM          Genetics:  NIPT low risk male (\"[place]\"), Carrier Screening Neg        OB hx:  G1  21 w/ TRINA at 39w6d (6#14); c/b A1DM, obesity, AMA, Dep/Anx (\"[place]\")  G2 Early preg loss in 2022  G3 current              AMA (36yo):    ASA 162mg discussed      23 [facility]: Normal NT and Nasal Bone. NIPT Negative, Male Fetus reviewed plan of care. Follow up MFM in 7 weeks for anatomy and echo.    2023 at [facility]: Normal anatomy/echo limited by fetal position, AC 89%, NENA WNL, Negative NIPT/Carrier screen  Follow up MFM in 6-8 weeks for growth/repeat echo.     FU w/ MFM on  ___         BMI 28.4, PCOS, hx A1DM  W/ G1 conceived w/ Femara x 1 cycle   Was on Femara the month prior, but conceived this pregnancy spontaneously  Hgb A1c 5.6--> early 2hr GTT deferred (checking fsbg --see above)   See above        Dep/Anx:  Stable on Celexa 20mg   No hx pp depression   Taking Ativan rarely PRN  -- taking on avg 1x/month

## 2023-04-26 ENCOUNTER — ROUTINE PRENATAL (OUTPATIENT)
Dept: OBGYN CLINIC | Age: 38
End: 2023-04-26
Payer: COMMERCIAL

## 2023-04-26 VITALS — SYSTOLIC BLOOD PRESSURE: 116 MMHG | DIASTOLIC BLOOD PRESSURE: 72 MMHG

## 2023-04-26 DIAGNOSIS — Z86.32 HISTORY OF GESTATIONAL DIABETES: ICD-10-CM

## 2023-04-26 DIAGNOSIS — O24.415 GESTATIONAL DIABETES MELLITUS (GDM) IN THIRD TRIMESTER CONTROLLED ON ORAL HYPOGLYCEMIC DRUG: ICD-10-CM

## 2023-04-26 DIAGNOSIS — O24.415 ORAL HYPOGLYCEMIC CONTROLLED WHITE CLASSIFICATION A2 GESTATIONAL DIABETES MELLITUS (GDM): ICD-10-CM

## 2023-04-26 DIAGNOSIS — O99.213 OBESITY AFFECTING PREGNANCY IN THIRD TRIMESTER: Primary | ICD-10-CM

## 2023-04-26 DIAGNOSIS — O99.340 DEPRESSION AFFECTING PREGNANCY: ICD-10-CM

## 2023-04-26 DIAGNOSIS — F32.A DEPRESSION AFFECTING PREGNANCY: ICD-10-CM

## 2023-04-26 DIAGNOSIS — O09.523 HIGH RISK PREGNANCY, MULTIGRAVIDA OF ADVANCED MATERNAL AGE IN THIRD TRIMESTER: ICD-10-CM

## 2023-04-26 DIAGNOSIS — Z87.42 HISTORY OF POLYCYSTIC OVARIAN DISEASE: ICD-10-CM

## 2023-04-26 DIAGNOSIS — Z3A.29 29 WEEKS GESTATION OF PREGNANCY: ICD-10-CM

## 2023-04-26 PROCEDURE — 76819 FETAL BIOPHYS PROFIL W/O NST: CPT | Performed by: OBSTETRICS & GYNECOLOGY

## 2023-04-26 PROCEDURE — 96127 BRIEF EMOTIONAL/BEHAV ASSMT: CPT | Performed by: OBSTETRICS & GYNECOLOGY

## 2023-04-26 PROCEDURE — 99214 OFFICE O/P EST MOD 30 MIN: CPT | Performed by: OBSTETRICS & GYNECOLOGY

## 2023-04-26 PROCEDURE — 76816 OB US FOLLOW-UP PER FETUS: CPT | Performed by: OBSTETRICS & GYNECOLOGY

## 2023-04-26 ASSESSMENT — PATIENT HEALTH QUESTIONNAIRE - PHQ9
SUM OF ALL RESPONSES TO PHQ9 QUESTIONS 1 & 2: 0
SUM OF ALL RESPONSES TO PHQ QUESTIONS 1-9: 0
2. FEELING DOWN, DEPRESSED OR HOPELESS: 0
SUM OF ALL RESPONSES TO PHQ QUESTIONS 1-9: 0
1. LITTLE INTEREST OR PLEASURE IN DOING THINGS: 0

## 2023-04-26 NOTE — ASSESSMENT & PLAN NOTE
Diet and BG log reviewed. Numerous glucose elevations throughout day without specific pattern. .    · Recommend BG testing as noted in problem list and send logs weekly to OB and MFM offices. · At this time, patient should monitor- 4 times daily- fasting and 1 hour after starting each meal., 1 hour postprandial goal <120. and Fasting <95. · Will add/adjust medication if elevations become consistent. oral medication   · Recommend delivery In 39th week, sooner if maternal or fetal concerns  · May begin  milk expression program at 37 weeks. Pt to contact lactation consultants re this program in 35-36th weeks. Intrapartum Recommendations:  · Check glucose every 2hr in latent labor, 1hr in active labor. · Goal of glucose  while in labor, adjust IVF and insulin as needed. Postpartum Recommendations:   · May discontinue medical therapy for gestational DM at delivery. · Continue testing BG for 24 hrs post-partum. If normal, discontinue at that time. · Repeat 2 hr Glucose Tolerance Test at 4-12 weeks post-partum and every 1-2 years. · If unable to tolerate 2-hr GTT, may check fasting glucose 2-3 times in one week along with a hemoglobin a1c.   · ADA diabetes diagnostic criteria are any of the following: Hgb A1c > 6.5%, or Fasting > 126, or 2 pp > 200, or Random > 200 with symptoms  · Alternatively, can consider 2hr GTT while in hospital postpartum. · Pt should establish care with primary care provider and should be evaluated annually for development of diabetes.

## 2023-04-26 NOTE — PROGRESS NOTES
NIPT/Carrier screen  04/26/23 [facility]: Appropriate fetal growth; Normal repeat echo; AC 80%, NENA 20 cm, Dopplers WNL, BPP 8/8           Assessment & Plan Note:     Follow up MFM in 3-4 weeks for growth/DM      History of gestational diabetes 12/01/2022     Priority: Medium     Overview Note:     BMI 28.4 (PreGravid), PCOS, hx A1DM:  G1 conceived w/ Femara x 1 cycle   Was on Femara the month prior, but conceived this pregnancy spontaneously  Hgb A1C 5.6 --> needs early 2hr GTT around 16-18wks___   01/03/23 [facility]: patient states she has done some glucose testing and all WDL. Will get early GTT at 18 weeks. 1/26/23: Early 2hr GTT today -- pt states she wasn't aware for today   States has been checking her fsbg   Fastings <90  1hr after meals:  most <120   Wants to keep doing fsbg --- Will bring log at NV __       Depression affecting pregnancy 08/16/2016     Priority: Medium     Overview Note:      Dep/Anx:  Stable on Celexa 20mg   No hx pp depression   Taking Ativan rarely PRN  -- taking on avg 1x/month  04/26/23 [facility]: Taking Celexa 20mg po daily; having some anxiety re possibilities of fetal impact from GDM. Reassurance provided. Discussion of options to decrease anxiety.              Gastroesophageal reflux in pregnancy 01/26/2023     Priority: Low    History of polycystic ovarian disease      Priority: Low     Overview Note:     See hx GDM overview         Obesity affecting pregnancy in third trimester 09/27/2016     Priority: Low     Overview Note:               Addressed normal pregnancy complaints, reassured and offered suggestions for care  Reviewed gestational age precautions and activity goals/limitations  Nutritional counseling as well as specific goals based on current maternal and fetal status  Options for GERD therapy if becomes symptomatic over course of pregnancy  Gestational age appropriate preventive care regarding communicable disease transmission and vaccines as appropriate (including flu, TDaP, and

## 2023-05-04 ENCOUNTER — ROUTINE PRENATAL (OUTPATIENT)
Dept: OBGYN CLINIC | Age: 38
End: 2023-05-04

## 2023-05-04 VITALS — DIASTOLIC BLOOD PRESSURE: 60 MMHG | SYSTOLIC BLOOD PRESSURE: 110 MMHG | WEIGHT: 219 LBS | BODY MASS INDEX: 33.3 KG/M2

## 2023-05-04 DIAGNOSIS — O99.213 OBESITY AFFECTING PREGNANCY IN THIRD TRIMESTER: ICD-10-CM

## 2023-05-04 DIAGNOSIS — Z87.42 HISTORY OF POLYCYSTIC OVARIAN DISEASE: ICD-10-CM

## 2023-05-04 DIAGNOSIS — O24.415 GESTATIONAL DIABETES MELLITUS (GDM) IN THIRD TRIMESTER CONTROLLED ON ORAL HYPOGLYCEMIC DRUG: ICD-10-CM

## 2023-05-04 DIAGNOSIS — O99.340 DEPRESSION AFFECTING PREGNANCY: ICD-10-CM

## 2023-05-04 DIAGNOSIS — O09.523 HIGH RISK PREGNANCY, MULTIGRAVIDA OF ADVANCED MATERNAL AGE IN THIRD TRIMESTER: Primary | ICD-10-CM

## 2023-05-04 DIAGNOSIS — F32.A DEPRESSION AFFECTING PREGNANCY: ICD-10-CM

## 2023-05-04 PROCEDURE — 99902 PR PRENATAL VISIT: CPT | Performed by: OBSTETRICS & GYNECOLOGY

## 2023-05-04 NOTE — PROGRESS NOTES
Autumn Noble 30w4d  Denies LOF, VB, Ctxs. Good FM. CBC wnl  S/p Tdap        A2DM:  23 [facility]: Appropriate fetal growth; Normal repeat echo; AC 80%, NENA 20 cm, Dopplers WNL, BPP  . Increased Metformin to 1000mg ER BID. If no improvement over the next few days, will change to insulin. Log reviewed:  Fastings: most wnl  1hr : Several mild elevations pp -- slightly >50%    Sees MFM again -- will re-eval insulin at that time        Genetics:  NIPT low risk male (\"[place]\"), Carrier Screening Neg        OB hx:  G1  21 w/ TRINA at 39w6d (6#14); c/b A1DM, obesity, AMA, Dep/Anx (\"[place]\")  G2 Early preg loss in 2022  G3 current              AMA (38yo):    ASA 162mg discussed    Normal NT and Nasal Bone.  NIPT Negative, Male    See above for growth      BMI 28.4, PCOS, hx A1DM  W/ G1 conceived w/ Femara x 1 cycle   Was on Femara the month prior, but conceived this pregnancy spontaneously  Hgb A1c 5.6--> early 2hr GTT deferred (checking fsbg --see above)           Dep/Anx:  Stable on Celexa 20mg   No hx pp depression   Taking Ativan rarely PRN  -- taking on avg 1x/month  : doing better

## 2023-05-09 DIAGNOSIS — F41.9 ANXIETY DISORDER, UNSPECIFIED TYPE: ICD-10-CM

## 2023-05-09 RX ORDER — LORAZEPAM 0.5 MG/1
TABLET ORAL
Qty: 30 TABLET | OUTPATIENT
Start: 2023-05-09

## 2023-05-12 ENCOUNTER — ROUTINE PRENATAL (OUTPATIENT)
Dept: OBGYN CLINIC | Age: 38
End: 2023-05-12
Payer: COMMERCIAL

## 2023-05-12 VITALS — DIASTOLIC BLOOD PRESSURE: 68 MMHG | SYSTOLIC BLOOD PRESSURE: 118 MMHG

## 2023-05-12 DIAGNOSIS — O99.213 OBESITY AFFECTING PREGNANCY IN THIRD TRIMESTER: ICD-10-CM

## 2023-05-12 DIAGNOSIS — Z86.32 HISTORY OF GESTATIONAL DIABETES: ICD-10-CM

## 2023-05-12 DIAGNOSIS — O99.619 GASTROESOPHAGEAL REFLUX IN PREGNANCY: ICD-10-CM

## 2023-05-12 DIAGNOSIS — O24.415 GESTATIONAL DIABETES MELLITUS (GDM) IN THIRD TRIMESTER CONTROLLED ON ORAL HYPOGLYCEMIC DRUG: ICD-10-CM

## 2023-05-12 DIAGNOSIS — K21.9 GASTROESOPHAGEAL REFLUX IN PREGNANCY: ICD-10-CM

## 2023-05-12 DIAGNOSIS — O99.340 DEPRESSION AFFECTING PREGNANCY: ICD-10-CM

## 2023-05-12 DIAGNOSIS — O09.523 HIGH RISK PREGNANCY, MULTIGRAVIDA OF ADVANCED MATERNAL AGE IN THIRD TRIMESTER: Primary | ICD-10-CM

## 2023-05-12 DIAGNOSIS — F32.A DEPRESSION AFFECTING PREGNANCY: ICD-10-CM

## 2023-05-12 DIAGNOSIS — Z87.42 HISTORY OF POLYCYSTIC OVARIAN DISEASE: ICD-10-CM

## 2023-05-12 PROCEDURE — 76816 OB US FOLLOW-UP PER FETUS: CPT | Performed by: OBSTETRICS & GYNECOLOGY

## 2023-05-12 PROCEDURE — 99214 OFFICE O/P EST MOD 30 MIN: CPT | Performed by: OBSTETRICS & GYNECOLOGY

## 2023-05-12 PROCEDURE — 76819 FETAL BIOPHYS PROFIL W/O NST: CPT | Performed by: OBSTETRICS & GYNECOLOGY

## 2023-05-12 PROCEDURE — 96127 BRIEF EMOTIONAL/BEHAV ASSMT: CPT | Performed by: OBSTETRICS & GYNECOLOGY

## 2023-05-12 RX ORDER — OMEPRAZOLE 40 MG/1
40 CAPSULE, DELAYED RELEASE ORAL DAILY
Qty: 30 CAPSULE | Refills: 5 | Status: SHIPPED | OUTPATIENT
Start: 2023-05-12

## 2023-05-12 ASSESSMENT — PATIENT HEALTH QUESTIONNAIRE - PHQ9
SUM OF ALL RESPONSES TO PHQ QUESTIONS 1-9: 0
SUM OF ALL RESPONSES TO PHQ QUESTIONS 1-9: 0
SUM OF ALL RESPONSES TO PHQ9 QUESTIONS 1 & 2: 0
SUM OF ALL RESPONSES TO PHQ QUESTIONS 1-9: 0
1. LITTLE INTEREST OR PLEASURE IN DOING THINGS: 0
SUM OF ALL RESPONSES TO PHQ QUESTIONS 1-9: 0
2. FEELING DOWN, DEPRESSED OR HOPELESS: 0

## 2023-05-18 ENCOUNTER — ROUTINE PRENATAL (OUTPATIENT)
Dept: OBGYN CLINIC | Age: 38
End: 2023-05-18

## 2023-05-18 VITALS — SYSTOLIC BLOOD PRESSURE: 126 MMHG | WEIGHT: 220 LBS | DIASTOLIC BLOOD PRESSURE: 80 MMHG | BODY MASS INDEX: 33.45 KG/M2

## 2023-05-18 DIAGNOSIS — Z87.42 HISTORY OF POLYCYSTIC OVARIAN DISEASE: ICD-10-CM

## 2023-05-18 DIAGNOSIS — O09.523 HIGH RISK PREGNANCY, MULTIGRAVIDA OF ADVANCED MATERNAL AGE IN THIRD TRIMESTER: Primary | ICD-10-CM

## 2023-05-18 DIAGNOSIS — O99.213 OBESITY AFFECTING PREGNANCY IN THIRD TRIMESTER: ICD-10-CM

## 2023-05-18 DIAGNOSIS — O24.415 GESTATIONAL DIABETES MELLITUS (GDM) IN THIRD TRIMESTER CONTROLLED ON ORAL HYPOGLYCEMIC DRUG: ICD-10-CM

## 2023-05-18 NOTE — PROGRESS NOTES
JUANY [id number] 32w4d  Denies LOF, VB, Ctxs. Good FM. S/p Tdap       Growht US w/ BPP NV in 2wks        A2DM:  23 [facility]: Appropriate fetal growth; Normal repeat echo; AC 80%, NENA 20 cm, Dopplers WNL, BPP  . Increased Metformin to 1000mg ER BID. If no improvement over the next few days, will change to insulin. 2023 at [facility]: Appropriate growth; AC 73%, Overall 77%, NENA 19.5 cm, UA Dopplers WNL, BPP . No follow-up with [facility], refer back PRN  Start twice weekly testing at 34 weeks along with growth with OB in 3-4 weeks - cc'd OB  See GDM Overview for log review and recommendations. DM managed remotely by CDE with MFM. Log reviewed:  Fastings: most wnl  1hr :  most wnl        Genetics:  NIPT low risk male Martinez Mott"), Carrier Screening Neg          OB hx:  G1  21 w/ TRINA at 39w6d (6#14); c/b A1DM, obesity, AMA, Dep/Anx (\"[place]\")  G2 Early preg loss in 2022  G3 current              AMA (39yo):    ASA 162mg discussed    Normal NT and Nasal Bone.  NIPT Negative, Male    See above for growth      BMI 28.4, PCOS, hx A1DM  W/ G1 conceived w/ Femara x 1 cycle   Was on Femara the month prior, but conceived this pregnancy spontaneously  Hgb A1c 5.6--> early 2hr GTT deferred (checking fsbg --see above)           Dep/Anx:  Stable on Celexa 20mg   No hx pp depression   Taking Ativan rarely PRN  -- taking on avg 1x/month  : doing better

## 2023-05-22 ENCOUNTER — TELEPHONE (OUTPATIENT)
Dept: OBGYN CLINIC | Age: 38
End: 2023-05-22
Payer: COMMERCIAL

## 2023-05-22 DIAGNOSIS — O24.415 GESTATIONAL DIABETES MELLITUS (GDM) IN THIRD TRIMESTER CONTROLLED ON ORAL HYPOGLYCEMIC DRUG: Primary | ICD-10-CM

## 2023-05-22 DIAGNOSIS — O99.340 DEPRESSION AFFECTING PREGNANCY: ICD-10-CM

## 2023-05-22 DIAGNOSIS — F32.A DEPRESSION AFFECTING PREGNANCY: ICD-10-CM

## 2023-05-22 PROCEDURE — 99091 COLLJ & INTERPJ DATA EA 30 D: CPT | Performed by: OBSTETRICS & GYNECOLOGY

## 2023-05-22 NOTE — TELEPHONE ENCOUNTER
Weekly blood sugar log reviewed. Contacted pt and advised them to continue Metformin XL as stated in the GDM A/P.    30 minutes spend on glucose interpretation and communication with patient by Molinda Saint. It is the standard of care that patients with diabetes in pregnancy have glycemic control monitored weekly. This may be done in person with the diabetic educator/physician or may occur virtually via email/[employer]. You may receive a bill from [address] [place] [address] for this service.

## 2023-05-26 ENCOUNTER — TELEPHONE (OUTPATIENT)
Dept: OBGYN CLINIC | Age: 38
End: 2023-05-26

## 2023-05-26 NOTE — TELEPHONE ENCOUNTER
Per MFM pt needs to start twice weekly testing at 34 weeks. Contacted pt to schedule, appointments made.

## 2023-05-30 ENCOUNTER — TELEPHONE (OUTPATIENT)
Dept: OBGYN CLINIC | Age: 38
End: 2023-05-30

## 2023-05-30 RX ORDER — METFORMIN HYDROCHLORIDE 500 MG/1
1000 TABLET, EXTENDED RELEASE ORAL 2 TIMES DAILY
Qty: 30 TABLET | Refills: 0 | Status: SHIPPED | OUTPATIENT
Start: 2023-05-30

## 2023-05-30 NOTE — TELEPHONE ENCOUNTER
Pt states she needs her prescription for Metformin updated. Pt states her dose has been adjusted therefore needs a new prescription for insurance to cover. Per Jadyn Monroy 12 office note from 5/18/23- \"Metformin to 1000mg ER BID. \" Rx sent. Pt notified.

## 2023-06-01 ENCOUNTER — ROUTINE PRENATAL (OUTPATIENT)
Dept: OBGYN CLINIC | Age: 38
End: 2023-06-01
Payer: COMMERCIAL

## 2023-06-01 VITALS — WEIGHT: 226 LBS | SYSTOLIC BLOOD PRESSURE: 110 MMHG | BODY MASS INDEX: 34.36 KG/M2 | DIASTOLIC BLOOD PRESSURE: 78 MMHG

## 2023-06-01 DIAGNOSIS — O09.523 HIGH RISK PREGNANCY, MULTIGRAVIDA OF ADVANCED MATERNAL AGE IN THIRD TRIMESTER: ICD-10-CM

## 2023-06-01 DIAGNOSIS — Z87.42 HISTORY OF POLYCYSTIC OVARIAN DISEASE: ICD-10-CM

## 2023-06-01 DIAGNOSIS — O24.415 GESTATIONAL DIABETES MELLITUS (GDM) IN THIRD TRIMESTER CONTROLLED ON ORAL HYPOGLYCEMIC DRUG: Primary | ICD-10-CM

## 2023-06-01 DIAGNOSIS — O36.63X0 EXCESSIVE FETAL GROWTH AFFECTING MANAGEMENT OF PREGNANCY IN THIRD TRIMESTER, SINGLE OR UNSPECIFIED FETUS: ICD-10-CM

## 2023-06-01 DIAGNOSIS — O99.213 OBESITY AFFECTING PREGNANCY IN THIRD TRIMESTER: ICD-10-CM

## 2023-06-01 PROCEDURE — 76816 OB US FOLLOW-UP PER FETUS: CPT | Performed by: OBSTETRICS & GYNECOLOGY

## 2023-06-01 PROCEDURE — 99902 PR PRENATAL VISIT: CPT | Performed by: OBSTETRICS & GYNECOLOGY

## 2023-06-01 PROCEDURE — 76819 FETAL BIOPHYS PROFIL W/O NST: CPT | Performed by: OBSTETRICS & GYNECOLOGY

## 2023-06-01 NOTE — PROGRESS NOTES
JUANY [id number] 34w4d  Denies LOF, VB, Ctxs. Good FM. S/p Tdap       Growht US w/ BPP NV in 2wks        A2DM:  23 [facility]: Appropriate fetal growth; Normal repeat echo; AC 80%, NENA 20 cm, Dopplers WNL, BPP  . Increased Metformin to 1000mg ER BID. If no improvement over the next few days, will change to insulin. 2023 at [facility]: Appropriate growth; AC 73%, Overall 77%, NENA 19.5 cm, UA Dopplers WNL, BPP . No follow-up with [facility], refer back PRN  Start twice weekly testing at 34 weeks along with growth with OB in 3-4 weeks - cc'd OB  See GDM Overview for log review and recommendations. DM managed remotely by CDE with [facility]. Log reviewed:  Fastings: only 1 mild range   1hr :  <50% mildlly abnl      States sending log to [facility] today       Growth US/BPP today:  GP 87% (6#15), AC 99%, NENA 24 (DVP 6.4)  BPP    [facility]          Genetics:  NIPT low risk male Chente Padgett"), Carrier Screening Neg          OB hx:  G1  21 w/ TRINA at 39w6d (6#14); c/b A1DM, obesity, AMA, Dep/Anx (\"[place]\")  G2 Early preg loss in 2022  G3 current              AMA (39yo):    ASA 162mg discussed    Normal NT and Nasal Bone.  NIPT Negative, Male    See above for growth        BMI 28.4, PCOS, hx A1DM  W/ G1 conceived w/ Femara x 1 cycle   Was on Femara the month prior, but conceived this pregnancy spontaneously  Hgb A1c 5.6--> early 2hr GTT deferred (checking fsbg --see above)           Dep/Anx:  Stable on Celexa 20mg   No hx pp depression   Taking Ativan rarely PRN  -- taking on avg 1x/month  : doing better

## 2023-06-06 ENCOUNTER — ROUTINE PRENATAL (OUTPATIENT)
Dept: OBGYN CLINIC | Age: 38
End: 2023-06-06

## 2023-06-06 VITALS — SYSTOLIC BLOOD PRESSURE: 120 MMHG | DIASTOLIC BLOOD PRESSURE: 80 MMHG | BODY MASS INDEX: 34.76 KG/M2 | WEIGHT: 228.6 LBS

## 2023-06-06 DIAGNOSIS — O99.213 OBESITY AFFECTING PREGNANCY IN THIRD TRIMESTER: ICD-10-CM

## 2023-06-06 DIAGNOSIS — O24.415 GESTATIONAL DIABETES MELLITUS (GDM) IN THIRD TRIMESTER CONTROLLED ON ORAL HYPOGLYCEMIC DRUG: ICD-10-CM

## 2023-06-06 DIAGNOSIS — Z87.42 HISTORY OF POLYCYSTIC OVARIAN DISEASE: ICD-10-CM

## 2023-06-06 DIAGNOSIS — O36.63X0 EXCESSIVE FETAL GROWTH AFFECTING MANAGEMENT OF PREGNANCY IN THIRD TRIMESTER, SINGLE OR UNSPECIFIED FETUS: ICD-10-CM

## 2023-06-06 DIAGNOSIS — F32.A DEPRESSION AFFECTING PREGNANCY: ICD-10-CM

## 2023-06-06 DIAGNOSIS — O99.340 DEPRESSION AFFECTING PREGNANCY: ICD-10-CM

## 2023-06-06 DIAGNOSIS — O09.523 HIGH RISK PREGNANCY, MULTIGRAVIDA OF ADVANCED MATERNAL AGE IN THIRD TRIMESTER: Primary | ICD-10-CM

## 2023-06-06 PROCEDURE — 99902 PR PRENATAL VISIT: CPT | Performed by: OBSTETRICS & GYNECOLOGY

## 2023-06-06 RX ORDER — FLUCONAZOLE 150 MG/1
150 TABLET ORAL ONCE
Qty: 1 TABLET | Refills: 0 | Status: SHIPPED | OUTPATIENT
Start: 2023-06-06 | End: 2023-06-06

## 2023-06-06 RX ORDER — FLUCONAZOLE 150 MG/1
150 TABLET ORAL
Qty: 2 TABLET | Refills: 0 | Status: SHIPPED | OUTPATIENT
Start: 2023-06-06

## 2023-06-06 NOTE — PROGRESS NOTES
JUANY [id number] 35w2d  Denies VB, Ctxs. Good FM. ? LOF      GBS today     SSE:   Neg nitrzine, pooling, ferning    +yeast -- Rx Diflucan     SVE: 2.5/50/-3  Labor precautions given       S/p Tdap          A2DM:  23 [facility]: Appropriate fetal growth; Normal repeat echo; AC 80%, ENNA 20 cm, Dopplers WNL, BPP 8/8 . Increased Metformin to 1000mg ER BID. If no improvement over the next few days, will change to insulin. 2023 at [facility]: Appropriate growth; AC 73%, Overall 77%, NENA 19.5 cm, UA Dopplers WNL, BPP 8/8. No follow-up with [facility], refer back PRN  Start twice weekly testing at 34 weeks along with growth with OB in 3-4 weeks - cc'd OB  See GDM Overview for log review and recommendations. DM managed remotely by CDE with MFM. Log  forgot today         US 23: GP 87% (6#15), AC 99%, NENA 24 (DVP 6.4)  BPP 8/8   [facility]      NST today Cat I, R    (16min)    Genetics:  NIPT low risk male Jd Peoples"), Carrier Screening Neg          OB hx:  G1  21 w/ TRINA at 39w6d (6#14); c/b A1DM, obesity, AMA, Dep/Anx (\"[place]\")  G2 Early preg loss in 2022  G3 current              AMA (37yo):    ASA 162mg discussed    Normal NT and Nasal Bone.  NIPT Negative, Male    See above for growth        BMI 28.4, PCOS, hx A1DM  W/ G1 conceived w/ Femara x 1 cycle   Was on Femara the month prior, but conceived this pregnancy spontaneously  Hgb A1c 5.6--> early 2hr GTT deferred (checking fsbg --see above)           Dep/Anx:  Stable on Celexa 20mg   No hx pp depression   Taking Ativan rarely PRN  -- taking on avg 1x/month  : doing better

## 2023-06-08 DIAGNOSIS — O24.415 GESTATIONAL DIABETES MELLITUS (GDM) IN THIRD TRIMESTER CONTROLLED ON ORAL HYPOGLYCEMIC DRUG: Primary | ICD-10-CM

## 2023-06-08 RX ORDER — ASPIRIN 81 MG/1
TABLET, COATED ORAL
Qty: 90 TABLET | Refills: 4 | OUTPATIENT
Start: 2023-06-08

## 2023-06-08 RX ORDER — METFORMIN HYDROCHLORIDE 500 MG/1
TABLET, EXTENDED RELEASE ORAL
Qty: 30 TABLET | Refills: 0 | OUTPATIENT
Start: 2023-06-08

## 2023-06-09 ENCOUNTER — ROUTINE PRENATAL (OUTPATIENT)
Dept: OBGYN CLINIC | Age: 38
End: 2023-06-09

## 2023-06-09 VITALS — WEIGHT: 226 LBS | BODY MASS INDEX: 34.36 KG/M2 | SYSTOLIC BLOOD PRESSURE: 130 MMHG | DIASTOLIC BLOOD PRESSURE: 78 MMHG

## 2023-06-09 DIAGNOSIS — Z87.42 HISTORY OF POLYCYSTIC OVARIAN DISEASE: ICD-10-CM

## 2023-06-09 DIAGNOSIS — O09.523 HIGH RISK PREGNANCY, MULTIGRAVIDA OF ADVANCED MATERNAL AGE IN THIRD TRIMESTER: ICD-10-CM

## 2023-06-09 DIAGNOSIS — O24.415 GESTATIONAL DIABETES MELLITUS (GDM) IN THIRD TRIMESTER CONTROLLED ON ORAL HYPOGLYCEMIC DRUG: ICD-10-CM

## 2023-06-09 DIAGNOSIS — O99.213 OBESITY AFFECTING PREGNANCY IN THIRD TRIMESTER: Primary | ICD-10-CM

## 2023-06-09 LAB
BACTERIA SPEC CULT: ABNORMAL
BACTERIA SPEC CULT: ABNORMAL
SERVICE CMNT-IMP: ABNORMAL

## 2023-06-09 RX ORDER — METFORMIN HYDROCHLORIDE 500 MG/1
1000 TABLET, EXTENDED RELEASE ORAL 2 TIMES DAILY
Qty: 120 TABLET | Refills: 2 | Status: SHIPPED | OUTPATIENT
Start: 2023-06-09

## 2023-06-09 RX ORDER — METFORMIN HYDROCHLORIDE 500 MG/1
1000 TABLET, EXTENDED RELEASE ORAL 2 TIMES DAILY
Qty: 120 TABLET | Refills: 2 | Status: SHIPPED | OUTPATIENT
Start: 2023-06-09 | End: 2023-06-09 | Stop reason: SDUPTHER

## 2023-06-09 NOTE — PROGRESS NOTES
ANDRES. [id number].  35w5d   Denies LOF, VB, Ctxs. Good FM. LBP patient. GBS pending. Treated for yeast at last visit. Sx improved     Vitals:    23 0917   BP: 130/78        Gestational diabetes mellitus (GDM) in third trimester controlled on oral hypoglycemic drug  BPP today 8/8  MFM following, on Metformin  Continue  testing       High risk pregnancy, multigravida of advanced maternal age in third trimester  Kick counts and labor precautions reviewed        Orders Placed This Encounter   Procedures    AMB POC US FETAL BIOPHYSICAL PROFILE W/O NON STRESS TESTING     Order Specific Question:   Are you Pregnant? Answer:    Yes        Maddy Jimenez, DO

## 2023-06-19 ENCOUNTER — TELEPHONE (OUTPATIENT)
Dept: OBGYN CLINIC | Age: 38
End: 2023-06-19
Payer: COMMERCIAL

## 2023-06-19 ENCOUNTER — ROUTINE PRENATAL (OUTPATIENT)
Dept: OBGYN CLINIC | Age: 38
End: 2023-06-19

## 2023-06-19 VITALS — WEIGHT: 232 LBS | SYSTOLIC BLOOD PRESSURE: 126 MMHG | DIASTOLIC BLOOD PRESSURE: 74 MMHG | BODY MASS INDEX: 35.28 KG/M2

## 2023-06-19 DIAGNOSIS — F32.A DEPRESSION AFFECTING PREGNANCY: ICD-10-CM

## 2023-06-19 DIAGNOSIS — Z87.42 HISTORY OF POLYCYSTIC OVARIAN DISEASE: ICD-10-CM

## 2023-06-19 DIAGNOSIS — Z86.32 HISTORY OF GESTATIONAL DIABETES: ICD-10-CM

## 2023-06-19 DIAGNOSIS — O24.415 GESTATIONAL DIABETES MELLITUS (GDM) IN THIRD TRIMESTER CONTROLLED ON ORAL HYPOGLYCEMIC DRUG: ICD-10-CM

## 2023-06-19 DIAGNOSIS — O99.213 OBESITY AFFECTING PREGNANCY IN THIRD TRIMESTER: ICD-10-CM

## 2023-06-19 DIAGNOSIS — O09.523 HIGH RISK PREGNANCY, MULTIGRAVIDA OF ADVANCED MATERNAL AGE IN THIRD TRIMESTER: Primary | ICD-10-CM

## 2023-06-19 DIAGNOSIS — O99.340 DEPRESSION AFFECTING PREGNANCY: ICD-10-CM

## 2023-06-19 DIAGNOSIS — O36.63X0 EXCESSIVE FETAL GROWTH AFFECTING MANAGEMENT OF PREGNANCY IN THIRD TRIMESTER, SINGLE OR UNSPECIFIED FETUS: ICD-10-CM

## 2023-06-19 DIAGNOSIS — O24.415 GESTATIONAL DIABETES MELLITUS (GDM) IN THIRD TRIMESTER CONTROLLED ON ORAL HYPOGLYCEMIC DRUG: Primary | ICD-10-CM

## 2023-06-19 PROCEDURE — 99091 COLLJ & INTERPJ DATA EA 30 D: CPT | Performed by: OBSTETRICS & GYNECOLOGY

## 2023-06-19 PROCEDURE — 99902 PR PRENATAL VISIT: CPT | Performed by: OBSTETRICS & GYNECOLOGY

## 2023-06-19 NOTE — TELEPHONE ENCOUNTER
Sallie Vinson for follow-up for diabetes management in pregnancy. BG log reviewed from 06/01 to 06/15, BGs consistently in an acceptable range, recommend continued control on BID Metformin ER 1000 mg can reduce to BID testing - fasting plus 1 rotating meal daily. Goals for glycemic control in pregnancy:    Maintain A1c of 6% during pregnancy, to be checked each trimester. In progress - recommend redraw 3rd trimester. For CGM users, in time range of 70% or greater. N/A    For FSBG users, glycemic control of FBG's < 95 and PP's < 120. In progress    30 minutes spent on review of diabetic education by DEE. I have reviewed the patient's diabetic assessment/plan by Collette Fireman, NICOLASA Whaley. I agree with treatment/plan as above. Margret Elizondo MD    It is the standard of care that patients with diabetes in pregnancy have glycemic control monitored weekly. It helps us better manage your gestational diabetes and overall care for you and your baby! This may be done in person with the diabetic educator/physician or may occur virtually via email/[employer]. Please remember to send your glucose log weekly to [facility] either through [facility] or [email or web address] may receive a bill from [address] [place] [address] for this service.

## 2023-06-19 NOTE — PROGRESS NOTES
JUANY [id number] 37w1d  Denies LOF, VB, Ctxs. Good FM. GBS +  SVE: 3/75/-3    Labor precautions given     S/p Tdap             A2DM:  23 [facility]: Appropriate fetal growth; Normal repeat echo; AC 80%, NENA 20 cm, Dopplers WNL, BPP  . Increased Metformin to 1000mg ER BID. If no improvement over the next few days, will change to insulin. 2023 at [facility]: Appropriate growth; AC 73%, Overall 77%, NENA 19.5 cm, UA Dopplers WNL, BPP . No follow-up with [facility], refer back PRN  Start twice weekly testing at 34 weeks along with growth with OB in 3-4 weeks - cc'd OB  See GDM Overview for log review and recommendations. DM managed remotely by CDE with MFM. Last growth US 23: GP 87% (6#15), AC 99%, NENA 24 (DVP 6.4); BPP       BPP (6/15/23)  , NENA 24 (DVP 8cm) -- c/w Poly         NST Today Cat I, R        Fasting all wnl per pt report   1hr pp occasional  mild elevations per report         Repeat growth US  w/ BPP on Thursday        Genetics:  NIPT low risk male Breanne Dilia"), Carrier Screening Neg           OB hx:  G1  21 w/ TRINA at 39w6d (6#14); c/b A1DM, obesity, AMA, Dep/Anx (\"[place]\")  G2 Early preg loss in 2022  G3 current              AMA (39yo):    ASA 162mg discussed    Normal NT and Nasal Bone.  NIPT Negative, Male    See above for growth        BMI 28.4, PCOS, hx A1DM  W/ G1 conceived w/ Femara x 1 cycle   Was on Femara the month prior, but conceived this pregnancy spontaneously  Hgb A1c 5.6--> early 2hr GTT deferred (checking fsbg --see above)           Dep/Anx:  Stable on Celexa 20mg   No hx pp depression   Taking Ativan rarely PRN  -- taking on avg 1x/month  : doing better

## 2023-06-22 ENCOUNTER — ROUTINE PRENATAL (OUTPATIENT)
Dept: OBGYN CLINIC | Age: 38
End: 2023-06-22

## 2023-06-22 VITALS — SYSTOLIC BLOOD PRESSURE: 112 MMHG | WEIGHT: 231 LBS | DIASTOLIC BLOOD PRESSURE: 78 MMHG | BODY MASS INDEX: 35.12 KG/M2

## 2023-06-22 DIAGNOSIS — O24.415 GESTATIONAL DIABETES MELLITUS (GDM) IN THIRD TRIMESTER CONTROLLED ON ORAL HYPOGLYCEMIC DRUG: ICD-10-CM

## 2023-06-22 DIAGNOSIS — Z3A.37 37 WEEKS GESTATION OF PREGNANCY: ICD-10-CM

## 2023-06-22 DIAGNOSIS — O36.63X0 EXCESSIVE FETAL GROWTH AFFECTING MANAGEMENT OF PREGNANCY IN THIRD TRIMESTER, SINGLE OR UNSPECIFIED FETUS: ICD-10-CM

## 2023-06-22 DIAGNOSIS — O09.523 HIGH RISK PREGNANCY, MULTIGRAVIDA OF ADVANCED MATERNAL AGE IN THIRD TRIMESTER: Primary | ICD-10-CM

## 2023-06-22 DIAGNOSIS — O40.3XX0 POLYHYDRAMNIOS IN THIRD TRIMESTER COMPLICATION, SINGLE OR UNSPECIFIED FETUS: ICD-10-CM

## 2023-06-22 ASSESSMENT — PATIENT HEALTH QUESTIONNAIRE - PHQ9
1. LITTLE INTEREST OR PLEASURE IN DOING THINGS: 0
SUM OF ALL RESPONSES TO PHQ9 QUESTIONS 1 & 2: 0
10. IF YOU CHECKED OFF ANY PROBLEMS, HOW DIFFICULT HAVE THESE PROBLEMS MADE IT FOR YOU TO DO YOUR WORK, TAKE CARE OF THINGS AT HOME, OR GET ALONG WITH OTHER PEOPLE: 0
9. THOUGHTS THAT YOU WOULD BE BETTER OFF DEAD, OR OF HURTING YOURSELF: 0
SUM OF ALL RESPONSES TO PHQ QUESTIONS 1-9: 0
SUM OF ALL RESPONSES TO PHQ QUESTIONS 1-9: 0
6. FEELING BAD ABOUT YOURSELF - OR THAT YOU ARE A FAILURE OR HAVE LET YOURSELF OR YOUR FAMILY DOWN: 0
7. TROUBLE CONCENTRATING ON THINGS, SUCH AS READING THE NEWSPAPER OR WATCHING TELEVISION: 0
3. TROUBLE FALLING OR STAYING ASLEEP: 0
8. MOVING OR SPEAKING SO SLOWLY THAT OTHER PEOPLE COULD HAVE NOTICED. OR THE OPPOSITE, BEING SO FIGETY OR RESTLESS THAT YOU HAVE BEEN MOVING AROUND A LOT MORE THAN USUAL: 0
5. POOR APPETITE OR OVEREATING: 0
SUM OF ALL RESPONSES TO PHQ QUESTIONS 1-9: 0
SUM OF ALL RESPONSES TO PHQ QUESTIONS 1-9: 0
4. FEELING TIRED OR HAVING LITTLE ENERGY: 0
2. FEELING DOWN, DEPRESSED OR HOPELESS: 0

## 2023-06-22 NOTE — PROGRESS NOTES
Nila  presents for ANDRES. [id number]. 37w4d. PL and MFM notes reviewed as applicable. Taking Prenatal Vitamins: Yes  She is noticing:  no unusual complaints  fastings in 70s-80s  PP (rotating meals) <120  1st baby was 6-10 at 39+wks. No trouble with delivery. Only pushed ~10 times. We reviewed risks of shoulder dystocia, its potential catastrophic effects for baby, its unpredictability, 10-15% error range for EFW with u/s. She is going to resume cking bs's at all meals. Continue low carb diet. Certainly rec c/s if poor progress is made while in active labor. IOL at 39wks appropriate    US shows:  Efw 3895gm  Bpd 95%  AC>99%  HC/AC 0.92 (0.92-1.05)  Autumn poly at 25  Cephalic  Bpp 8/8  Images reviewed.       Diagnoses and all orders for this visit:    High risk pregnancy, multigravida of advanced maternal age in third trimester  -     AMB POC US FETAL BIOPHYSICAL PROFILE W/O NON STRESS TESTING  -     AMB POC US OB RE-EVAL/FOLLOW UP    Gestational diabetes mellitus (GDM) in third trimester controlled on oral hypoglycemic drug  -     AMB POC US FETAL BIOPHYSICAL PROFILE W/O NON STRESS TESTING  -     AMB POC US OB RE-EVAL/FOLLOW UP    Excessive fetal growth affecting management of pregnancy in third trimester, single or unspecified fetus  -     AMB POC US FETAL BIOPHYSICAL PROFILE W/O NON STRESS TESTING  -     AMB POC US OB RE-EVAL/FOLLOW UP    Polyhydramnios in third trimester complication, single or unspecified fetus  -     AMB POC US FETAL BIOPHYSICAL PROFILE W/O NON STRESS TESTING  -     AMB POC US OB RE-EVAL/FOLLOW UP    37 weeks gestation of pregnancy  -     AMB POC US FETAL BIOPHYSICAL PROFILE W/O NON STRESS TESTING  -     AMB POC US OB RE-EVAL/FOLLOW UP

## 2023-06-26 ENCOUNTER — ROUTINE PRENATAL (OUTPATIENT)
Dept: OBGYN CLINIC | Age: 38
End: 2023-06-26
Payer: COMMERCIAL

## 2023-06-26 VITALS — SYSTOLIC BLOOD PRESSURE: 140 MMHG | BODY MASS INDEX: 35.12 KG/M2 | WEIGHT: 231 LBS | DIASTOLIC BLOOD PRESSURE: 84 MMHG

## 2023-06-26 DIAGNOSIS — Z3A.38 38 WEEKS GESTATION OF PREGNANCY: ICD-10-CM

## 2023-06-26 DIAGNOSIS — O09.523 HIGH RISK PREGNANCY, MULTIGRAVIDA OF ADVANCED MATERNAL AGE IN THIRD TRIMESTER: Primary | ICD-10-CM

## 2023-06-26 DIAGNOSIS — O16.3 ELEVATED BLOOD PRESSURE AFFECTING PREGNANCY IN THIRD TRIMESTER, ANTEPARTUM: ICD-10-CM

## 2023-06-26 DIAGNOSIS — O99.340 DEPRESSION AFFECTING PREGNANCY: ICD-10-CM

## 2023-06-26 DIAGNOSIS — F32.A DEPRESSION AFFECTING PREGNANCY: ICD-10-CM

## 2023-06-26 DIAGNOSIS — O36.63X0 EXCESSIVE FETAL GROWTH AFFECTING MANAGEMENT OF PREGNANCY IN THIRD TRIMESTER, SINGLE OR UNSPECIFIED FETUS: ICD-10-CM

## 2023-06-26 DIAGNOSIS — O24.415 GESTATIONAL DIABETES MELLITUS (GDM) IN THIRD TRIMESTER CONTROLLED ON ORAL HYPOGLYCEMIC DRUG: ICD-10-CM

## 2023-06-26 LAB
ERYTHROCYTE [DISTWIDTH] IN BLOOD BY AUTOMATED COUNT: 13.9 % (ref 11.9–14.6)
HCT VFR BLD AUTO: 38.6 % (ref 35.8–46.3)
HGB BLD-MCNC: 12.4 G/DL (ref 11.7–15.4)
MCH RBC QN AUTO: 26.5 PG (ref 26.1–32.9)
MCHC RBC AUTO-ENTMCNC: 32.1 G/DL (ref 31.4–35)
MCV RBC AUTO: 82.5 FL (ref 82–102)
NRBC # BLD: 0 K/UL (ref 0–0.2)
PLATELET # BLD AUTO: 224 K/UL (ref 150–450)
PMV BLD AUTO: 11.7 FL (ref 9.4–12.3)
RBC # BLD AUTO: 4.68 M/UL (ref 4.05–5.2)
WBC # BLD AUTO: 9 K/UL (ref 4.3–11.1)

## 2023-06-26 PROCEDURE — 59025 FETAL NON-STRESS TEST: CPT | Performed by: NURSE PRACTITIONER

## 2023-06-26 PROCEDURE — 99902 PR PRENATAL VISIT: CPT | Performed by: NURSE PRACTITIONER

## 2023-06-27 ENCOUNTER — TELEPHONE (OUTPATIENT)
Dept: OBGYN CLINIC | Age: 38
End: 2023-06-27

## 2023-06-27 PROBLEM — O16.3 ELEVATED BLOOD PRESSURE AFFECTING PREGNANCY IN THIRD TRIMESTER, ANTEPARTUM: Status: ACTIVE | Noted: 2023-06-27

## 2023-06-27 LAB
ALBUMIN SERPL-MCNC: 2.8 G/DL (ref 3.5–5)
ALBUMIN/GLOB SERPL: 0.8 (ref 0.4–1.6)
ALP SERPL-CCNC: 176 U/L (ref 50–136)
ALT SERPL-CCNC: 12 U/L (ref 12–65)
ANION GAP SERPL CALC-SCNC: 6 MMOL/L (ref 2–11)
AST SERPL-CCNC: 13 U/L (ref 15–37)
BILIRUB SERPL-MCNC: 0.3 MG/DL (ref 0.2–1.1)
BUN SERPL-MCNC: 12 MG/DL (ref 6–23)
CALCIUM SERPL-MCNC: 9.8 MG/DL (ref 8.3–10.4)
CHLORIDE SERPL-SCNC: 110 MMOL/L (ref 101–110)
CO2 SERPL-SCNC: 22 MMOL/L (ref 21–32)
CREAT SERPL-MCNC: 0.8 MG/DL (ref 0.6–1)
CREAT UR-MCNC: 50 MG/DL
EST. AVERAGE GLUCOSE BLD GHB EST-MCNC: 126 MG/DL
GLOBULIN SER CALC-MCNC: 3.4 G/DL (ref 2.8–4.5)
GLUCOSE SERPL-MCNC: 80 MG/DL (ref 65–100)
HBA1C MFR BLD: 6 % (ref 4.8–5.6)
LDH SERPL L TO P-CCNC: 199 U/L (ref 100–190)
POTASSIUM SERPL-SCNC: 4.4 MMOL/L (ref 3.5–5.1)
PROT SERPL-MCNC: 6.2 G/DL (ref 6.3–8.2)
PROT UR-MCNC: 11 MG/DL
PROT/CREAT UR-RTO: 0.2
SODIUM SERPL-SCNC: 138 MMOL/L (ref 133–143)
URATE SERPL-MCNC: 5.9 MG/DL (ref 2.6–6)

## 2023-06-29 ENCOUNTER — ROUTINE PRENATAL (OUTPATIENT)
Dept: OBGYN CLINIC | Age: 38
End: 2023-06-29
Payer: COMMERCIAL

## 2023-06-29 ENCOUNTER — TELEPHONE (OUTPATIENT)
Dept: OBGYN CLINIC | Age: 38
End: 2023-06-29

## 2023-06-29 VITALS — DIASTOLIC BLOOD PRESSURE: 84 MMHG | WEIGHT: 232 LBS | SYSTOLIC BLOOD PRESSURE: 130 MMHG | BODY MASS INDEX: 35.28 KG/M2

## 2023-06-29 DIAGNOSIS — O40.3XX0 POLYHYDRAMNIOS IN THIRD TRIMESTER COMPLICATION, SINGLE OR UNSPECIFIED FETUS: ICD-10-CM

## 2023-06-29 DIAGNOSIS — O36.63X0 EXCESSIVE FETAL GROWTH AFFECTING MANAGEMENT OF PREGNANCY IN THIRD TRIMESTER, SINGLE OR UNSPECIFIED FETUS: ICD-10-CM

## 2023-06-29 DIAGNOSIS — O16.3 ELEVATED BLOOD PRESSURE AFFECTING PREGNANCY IN THIRD TRIMESTER, ANTEPARTUM: ICD-10-CM

## 2023-06-29 DIAGNOSIS — Z87.42 HISTORY OF POLYCYSTIC OVARIAN DISEASE: ICD-10-CM

## 2023-06-29 DIAGNOSIS — O99.213 OBESITY AFFECTING PREGNANCY IN THIRD TRIMESTER: ICD-10-CM

## 2023-06-29 DIAGNOSIS — O24.415 GESTATIONAL DIABETES MELLITUS (GDM) IN THIRD TRIMESTER CONTROLLED ON ORAL HYPOGLYCEMIC DRUG: ICD-10-CM

## 2023-06-29 DIAGNOSIS — O09.523 HIGH RISK PREGNANCY, MULTIGRAVIDA OF ADVANCED MATERNAL AGE IN THIRD TRIMESTER: Primary | ICD-10-CM

## 2023-06-29 PROCEDURE — 76819 FETAL BIOPHYS PROFIL W/O NST: CPT | Performed by: OBSTETRICS & GYNECOLOGY

## 2023-06-29 PROCEDURE — 99902 PR PRENATAL VISIT: CPT | Performed by: OBSTETRICS & GYNECOLOGY

## 2023-07-02 ENCOUNTER — ANESTHESIA EVENT (OUTPATIENT)
Dept: LABOR AND DELIVERY | Age: 38
End: 2023-07-02
Payer: COMMERCIAL

## 2023-07-02 ENCOUNTER — HOSPITAL ENCOUNTER (INPATIENT)
Age: 38
LOS: 2 days | Discharge: HOME | End: 2023-07-04
Attending: OBSTETRICS & GYNECOLOGY | Admitting: OBSTETRICS & GYNECOLOGY
Payer: COMMERCIAL

## 2023-07-02 ENCOUNTER — ANESTHESIA (OUTPATIENT)
Dept: LABOR AND DELIVERY | Age: 38
End: 2023-07-02
Payer: COMMERCIAL

## 2023-07-02 PROBLEM — Z34.90 ENCOUNTER FOR INDUCTION OF LABOR: Status: ACTIVE | Noted: 2023-07-02

## 2023-07-02 PROBLEM — Z3A.39 39 WEEKS GESTATION OF PREGNANCY: Status: ACTIVE | Noted: 2023-07-02

## 2023-07-02 LAB
ABO + RH BLD: NORMAL
BASOPHILS # BLD: 0 K/UL (ref 0–0.2)
BASOPHILS NFR BLD: 0 % (ref 0–2)
BLOOD GROUP ANTIBODIES SERPL: NORMAL
DIFFERENTIAL METHOD BLD: ABNORMAL
EOSINOPHIL # BLD: 0 K/UL (ref 0–0.8)
EOSINOPHIL NFR BLD: 0 % (ref 0.5–7.8)
ERYTHROCYTE [DISTWIDTH] IN BLOOD BY AUTOMATED COUNT: 13.8 % (ref 11.9–14.6)
GLUCOSE BLD STRIP.AUTO-MCNC: 85 MG/DL (ref 65–100)
GLUCOSE BLD STRIP.AUTO-MCNC: 94 MG/DL (ref 65–100)
GLUCOSE BLD STRIP.AUTO-MCNC: 96 MG/DL (ref 65–100)
HCT VFR BLD AUTO: 37.3 % (ref 35.8–46.3)
HGB BLD-MCNC: 12.1 G/DL (ref 11.7–15.4)
IMM GRANULOCYTES # BLD AUTO: 0.1 K/UL (ref 0–0.5)
IMM GRANULOCYTES NFR BLD AUTO: 1 % (ref 0–5)
LYMPHOCYTES # BLD: 2.1 K/UL (ref 0.5–4.6)
LYMPHOCYTES NFR BLD: 21 % (ref 13–44)
MCH RBC QN AUTO: 26 PG (ref 26.1–32.9)
MCHC RBC AUTO-ENTMCNC: 32.4 G/DL (ref 31.4–35)
MCV RBC AUTO: 80 FL (ref 82–102)
MONOCYTES # BLD: 0.7 K/UL (ref 0.1–1.3)
MONOCYTES NFR BLD: 7 % (ref 4–12)
NEUTS SEG # BLD: 7.3 K/UL (ref 1.7–8.2)
NEUTS SEG NFR BLD: 72 % (ref 43–78)
NRBC # BLD: 0 K/UL (ref 0–0.2)
PLATELET # BLD AUTO: 190 K/UL (ref 150–450)
PMV BLD AUTO: 10.4 FL (ref 9.4–12.3)
RBC # BLD AUTO: 4.66 M/UL (ref 4.05–5.2)
SERVICE CMNT-IMP: NORMAL
SPECIMEN EXP DATE BLD: NORMAL
WBC # BLD AUTO: 10.2 K/UL (ref 4.3–11.1)

## 2023-07-02 PROCEDURE — 85025 COMPLETE CBC W/AUTO DIFF WBC: CPT

## 2023-07-02 PROCEDURE — 7210000100 HC LABOR FEE PER 1 HR

## 2023-07-02 PROCEDURE — 6370000000 HC RX 637 (ALT 250 FOR IP): Performed by: OBSTETRICS & GYNECOLOGY

## 2023-07-02 PROCEDURE — 2500000003 HC RX 250 WO HCPCS: Performed by: ANESTHESIOLOGY

## 2023-07-02 PROCEDURE — 3700000025 EPIDURAL BLOCK: Performed by: ANESTHESIOLOGY

## 2023-07-02 PROCEDURE — 51701 INSERT BLADDER CATHETER: CPT

## 2023-07-02 PROCEDURE — 00HU33Z INSERTION OF INFUSION DEVICE INTO SPINAL CANAL, PERCUTANEOUS APPROACH: ICD-10-PCS | Performed by: ANESTHESIOLOGY

## 2023-07-02 PROCEDURE — 6360000002 HC RX W HCPCS: Performed by: ANESTHESIOLOGY

## 2023-07-02 PROCEDURE — 7100000011 HC PHASE II RECOVERY - ADDTL 15 MIN

## 2023-07-02 PROCEDURE — 6360000002 HC RX W HCPCS: Performed by: OBSTETRICS & GYNECOLOGY

## 2023-07-02 PROCEDURE — 86850 RBC ANTIBODY SCREEN: CPT

## 2023-07-02 PROCEDURE — 7100000010 HC PHASE II RECOVERY - FIRST 15 MIN

## 2023-07-02 PROCEDURE — 7220000101 HC DELIVERY VAGINAL/SINGLE

## 2023-07-02 PROCEDURE — 86901 BLOOD TYPING SEROLOGIC RH(D): CPT

## 2023-07-02 PROCEDURE — 2580000003 HC RX 258: Performed by: OBSTETRICS & GYNECOLOGY

## 2023-07-02 PROCEDURE — 1100000000 HC RM PRIVATE

## 2023-07-02 PROCEDURE — 86900 BLOOD TYPING SEROLOGIC ABO: CPT

## 2023-07-02 PROCEDURE — 82962 GLUCOSE BLOOD TEST: CPT

## 2023-07-02 RX ORDER — SODIUM CHLORIDE 9 MG/ML
INJECTION, SOLUTION INTRAVENOUS PRN
Status: DISCONTINUED | OUTPATIENT
Start: 2023-07-02 | End: 2023-07-03

## 2023-07-02 RX ORDER — FAMOTIDINE 20 MG/1
20 TABLET, FILM COATED ORAL 2 TIMES DAILY PRN
Status: DISCONTINUED | OUTPATIENT
Start: 2023-07-02 | End: 2023-07-02

## 2023-07-02 RX ORDER — SODIUM CHLORIDE 0.9 % (FLUSH) 0.9 %
5-40 SYRINGE (ML) INJECTION EVERY 12 HOURS SCHEDULED
Status: DISCONTINUED | OUTPATIENT
Start: 2023-07-02 | End: 2023-07-03

## 2023-07-02 RX ORDER — ROPIVACAINE HYDROCHLORIDE 2 MG/ML
INJECTION, SOLUTION EPIDURAL; INFILTRATION; PERINEURAL PRN
Status: DISCONTINUED | OUTPATIENT
Start: 2023-07-02 | End: 2023-07-02 | Stop reason: SDUPTHER

## 2023-07-02 RX ORDER — SODIUM CHLORIDE, SODIUM LACTATE, POTASSIUM CHLORIDE, CALCIUM CHLORIDE 600; 310; 30; 20 MG/100ML; MG/100ML; MG/100ML; MG/100ML
INJECTION, SOLUTION INTRAVENOUS CONTINUOUS
Status: DISCONTINUED | OUTPATIENT
Start: 2023-07-02 | End: 2023-07-03

## 2023-07-02 RX ORDER — TRANEXAMIC ACID 10 MG/ML
1000 INJECTION, SOLUTION INTRAVENOUS
Status: DISCONTINUED | OUTPATIENT
Start: 2023-07-02 | End: 2023-07-02

## 2023-07-02 RX ORDER — METHYLERGONOVINE MALEATE 0.2 MG/ML
200 INJECTION INTRAVENOUS PRN
Status: DISCONTINUED | OUTPATIENT
Start: 2023-07-02 | End: 2023-07-02

## 2023-07-02 RX ORDER — MISOPROSTOL 200 UG/1
200 TABLET ORAL PRN
Status: DISCONTINUED | OUTPATIENT
Start: 2023-07-02 | End: 2023-07-04 | Stop reason: HOSPADM

## 2023-07-02 RX ORDER — IBUPROFEN 800 MG/1
800 TABLET ORAL EVERY 8 HOURS
Status: DISCONTINUED | OUTPATIENT
Start: 2023-07-02 | End: 2023-07-04 | Stop reason: HOSPADM

## 2023-07-02 RX ORDER — ROPIVACAINE HYDROCHLORIDE 5 MG/ML
INJECTION, SOLUTION EPIDURAL; INFILTRATION; PERINEURAL PRN
Status: DISCONTINUED | OUTPATIENT
Start: 2023-07-02 | End: 2023-07-02 | Stop reason: SDUPTHER

## 2023-07-02 RX ORDER — DEXTROSE, SODIUM CHLORIDE, SODIUM LACTATE, POTASSIUM CHLORIDE, AND CALCIUM CHLORIDE 5; .6; .31; .03; .02 G/100ML; G/100ML; G/100ML; G/100ML; G/100ML
INJECTION, SOLUTION INTRAVENOUS CONTINUOUS
Status: DISCONTINUED | OUTPATIENT
Start: 2023-07-02 | End: 2023-07-02

## 2023-07-02 RX ORDER — ACETAMINOPHEN 500 MG
1000 TABLET ORAL EVERY 8 HOURS PRN
Status: DISCONTINUED | OUTPATIENT
Start: 2023-07-02 | End: 2023-07-04 | Stop reason: HOSPADM

## 2023-07-02 RX ORDER — SODIUM CHLORIDE 0.9 % (FLUSH) 0.9 %
5-40 SYRINGE (ML) INJECTION PRN
Status: DISCONTINUED | OUTPATIENT
Start: 2023-07-02 | End: 2023-07-02

## 2023-07-02 RX ORDER — ONDANSETRON 2 MG/ML
4 INJECTION INTRAMUSCULAR; INTRAVENOUS EVERY 6 HOURS PRN
Status: DISCONTINUED | OUTPATIENT
Start: 2023-07-02 | End: 2023-07-02

## 2023-07-02 RX ORDER — LANOLIN
CREAM (ML) TOPICAL PRN
Status: DISCONTINUED | OUTPATIENT
Start: 2023-07-02 | End: 2023-07-04 | Stop reason: HOSPADM

## 2023-07-02 RX ORDER — MISOPROSTOL 200 UG/1
800 TABLET ORAL PRN
Status: DISCONTINUED | OUTPATIENT
Start: 2023-07-02 | End: 2023-07-02

## 2023-07-02 RX ORDER — POLYETHYLENE GLYCOL 3350 17 G/17G
17 POWDER, FOR SOLUTION ORAL DAILY
Status: DISCONTINUED | OUTPATIENT
Start: 2023-07-03 | End: 2023-07-04 | Stop reason: HOSPADM

## 2023-07-02 RX ORDER — SODIUM CHLORIDE 0.9 % (FLUSH) 0.9 %
5-40 SYRINGE (ML) INJECTION PRN
Status: DISCONTINUED | OUTPATIENT
Start: 2023-07-02 | End: 2023-07-03

## 2023-07-02 RX ORDER — TERBUTALINE SULFATE 1 MG/ML
0.25 INJECTION, SOLUTION SUBCUTANEOUS ONCE
Status: DISCONTINUED | OUTPATIENT
Start: 2023-07-02 | End: 2023-07-02

## 2023-07-02 RX ORDER — SODIUM CHLORIDE, SODIUM LACTATE, POTASSIUM CHLORIDE, AND CALCIUM CHLORIDE .6; .31; .03; .02 G/100ML; G/100ML; G/100ML; G/100ML
500 INJECTION, SOLUTION INTRAVENOUS PRN
Status: DISCONTINUED | OUTPATIENT
Start: 2023-07-02 | End: 2023-07-02

## 2023-07-02 RX ORDER — LIDOCAINE HYDROCHLORIDE AND EPINEPHRINE 15; 5 MG/ML; UG/ML
INJECTION, SOLUTION EPIDURAL PRN
Status: DISCONTINUED | OUTPATIENT
Start: 2023-07-02 | End: 2023-07-02 | Stop reason: SDUPTHER

## 2023-07-02 RX ORDER — SODIUM CHLORIDE, SODIUM LACTATE, POTASSIUM CHLORIDE, AND CALCIUM CHLORIDE .6; .31; .03; .02 G/100ML; G/100ML; G/100ML; G/100ML
1000 INJECTION, SOLUTION INTRAVENOUS PRN
Status: DISCONTINUED | OUTPATIENT
Start: 2023-07-02 | End: 2023-07-02

## 2023-07-02 RX ORDER — CITALOPRAM 20 MG/1
20 TABLET ORAL DAILY
Status: DISCONTINUED | OUTPATIENT
Start: 2023-07-03 | End: 2023-07-04 | Stop reason: HOSPADM

## 2023-07-02 RX ORDER — CARBOPROST TROMETHAMINE 250 UG/ML
250 INJECTION, SOLUTION INTRAMUSCULAR PRN
Status: DISCONTINUED | OUTPATIENT
Start: 2023-07-02 | End: 2023-07-02

## 2023-07-02 RX ORDER — SODIUM CHLORIDE 9 MG/ML
25 INJECTION, SOLUTION INTRAVENOUS PRN
Status: DISCONTINUED | OUTPATIENT
Start: 2023-07-02 | End: 2023-07-02

## 2023-07-02 RX ORDER — SODIUM CHLORIDE 0.9 % (FLUSH) 0.9 %
5-40 SYRINGE (ML) INJECTION EVERY 12 HOURS SCHEDULED
Status: DISCONTINUED | OUTPATIENT
Start: 2023-07-02 | End: 2023-07-02

## 2023-07-02 RX ORDER — OXYCODONE HYDROCHLORIDE 5 MG/1
5 TABLET ORAL EVERY 4 HOURS PRN
Status: DISCONTINUED | OUTPATIENT
Start: 2023-07-02 | End: 2023-07-04

## 2023-07-02 RX ORDER — METHYLERGONOVINE MALEATE 0.2 MG/ML
200 INJECTION INTRAVENOUS PRN
Status: DISCONTINUED | OUTPATIENT
Start: 2023-07-02 | End: 2023-07-04 | Stop reason: HOSPADM

## 2023-07-02 RX ORDER — ONDANSETRON 8 MG/1
8 TABLET, ORALLY DISINTEGRATING ORAL EVERY 8 HOURS PRN
Status: DISCONTINUED | OUTPATIENT
Start: 2023-07-02 | End: 2023-07-04 | Stop reason: HOSPADM

## 2023-07-02 RX ADMIN — Medication 2.5 MILLION UNITS: at 16:16

## 2023-07-02 RX ADMIN — IBUPROFEN 800 MG: 800 TABLET, FILM COATED ORAL at 19:53

## 2023-07-02 RX ADMIN — ROPIVACAINE HYDROCHLORIDE 4 ML: 2 INJECTION, SOLUTION EPIDURAL; INFILTRATION; PERINEURAL at 10:32

## 2023-07-02 RX ADMIN — Medication 166.7 ML: at 17:54

## 2023-07-02 RX ADMIN — Medication 1 MILLI-UNITS/MIN: at 07:23

## 2023-07-02 RX ADMIN — OXYCODONE HYDROCHLORIDE 5 MG: 5 TABLET ORAL at 22:27

## 2023-07-02 RX ADMIN — SODIUM CHLORIDE, POTASSIUM CHLORIDE, SODIUM LACTATE AND CALCIUM CHLORIDE 500 ML: 600; 310; 30; 20 INJECTION, SOLUTION INTRAVENOUS at 09:35

## 2023-07-02 RX ADMIN — SODIUM CHLORIDE, SODIUM LACTATE, POTASSIUM CHLORIDE, CALCIUM CHLORIDE AND DEXTROSE MONOHYDRATE: 5; 600; 310; 30; 20 INJECTION, SOLUTION INTRAVENOUS at 07:20

## 2023-07-02 RX ADMIN — Medication 2.5 MILLION UNITS: at 11:26

## 2023-07-02 RX ADMIN — SODIUM CHLORIDE, SODIUM LACTATE, POTASSIUM CHLORIDE, CALCIUM CHLORIDE AND DEXTROSE MONOHYDRATE: 5; 600; 310; 30; 20 INJECTION, SOLUTION INTRAVENOUS at 16:14

## 2023-07-02 RX ADMIN — SODIUM CHLORIDE, POTASSIUM CHLORIDE, SODIUM LACTATE AND CALCIUM CHLORIDE 1000 ML: 600; 310; 30; 20 INJECTION, SOLUTION INTRAVENOUS at 11:30

## 2023-07-02 RX ADMIN — Medication: at 21:07

## 2023-07-02 RX ADMIN — SODIUM CHLORIDE 5 MILLION UNITS: 900 INJECTION INTRAVENOUS at 07:22

## 2023-07-02 RX ADMIN — LIDOCAINE HYDROCHLORIDE,EPINEPHRINE BITARTRATE 5 ML: 15; .005 INJECTION, SOLUTION EPIDURAL; INFILTRATION; INTRACAUDAL; PERINEURAL at 10:26

## 2023-07-02 RX ADMIN — ROPIVACAINE HYDROCHLORIDE 3.5 ML: 5 INJECTION, SOLUTION EPIDURAL; INFILTRATION; PERINEURAL at 10:32

## 2023-07-02 RX ADMIN — ROPIVACAINE HYDROCHLORIDE 10 ML/HR: 2 INJECTION, SOLUTION EPIDURAL; INFILTRATION; PERINEURAL at 10:34

## 2023-07-02 ASSESSMENT — PAIN DESCRIPTION - LOCATION
LOCATION: ABDOMEN
LOCATION: ABDOMEN;PERINEUM;VAGINA

## 2023-07-02 ASSESSMENT — PAIN DESCRIPTION - DESCRIPTORS
DESCRIPTORS: CRAMPING;SHARP
DESCRIPTORS: CRAMPING

## 2023-07-02 ASSESSMENT — PAIN SCALES - GENERAL
PAINLEVEL_OUTOF10: 4
PAINLEVEL_OUTOF10: 5

## 2023-07-02 ASSESSMENT — PAIN DESCRIPTION - ORIENTATION: ORIENTATION: ANTERIOR;LOWER

## 2023-07-03 LAB
GLUCOSE BLD STRIP.AUTO-MCNC: 148 MG/DL (ref 65–100)
SERVICE CMNT-IMP: ABNORMAL

## 2023-07-03 PROCEDURE — 82962 GLUCOSE BLOOD TEST: CPT

## 2023-07-03 PROCEDURE — 6370000000 HC RX 637 (ALT 250 FOR IP): Performed by: OBSTETRICS & GYNECOLOGY

## 2023-07-03 PROCEDURE — 1100000000 HC RM PRIVATE

## 2023-07-03 RX ADMIN — POLYETHYLENE GLYCOL 3350 17 G: 17 POWDER, FOR SOLUTION ORAL at 08:00

## 2023-07-03 RX ADMIN — ACETAMINOPHEN 1000 MG: 500 TABLET, FILM COATED ORAL at 05:34

## 2023-07-03 RX ADMIN — ACETAMINOPHEN 1000 MG: 500 TABLET, FILM COATED ORAL at 23:50

## 2023-07-03 RX ADMIN — IBUPROFEN 800 MG: 800 TABLET, FILM COATED ORAL at 19:53

## 2023-07-03 RX ADMIN — CITALOPRAM HYDROBROMIDE 20 MG: 20 TABLET ORAL at 08:00

## 2023-07-03 RX ADMIN — IBUPROFEN 800 MG: 800 TABLET, FILM COATED ORAL at 12:08

## 2023-07-03 RX ADMIN — IBUPROFEN 800 MG: 800 TABLET, FILM COATED ORAL at 04:01

## 2023-07-03 RX ADMIN — OXYCODONE HYDROCHLORIDE 5 MG: 5 TABLET ORAL at 21:30

## 2023-07-03 RX ADMIN — ACETAMINOPHEN 1000 MG: 500 TABLET, FILM COATED ORAL at 15:49

## 2023-07-03 ASSESSMENT — PAIN DESCRIPTION - LOCATION
LOCATION: ABDOMEN

## 2023-07-03 ASSESSMENT — PAIN SCALES - GENERAL
PAINLEVEL_OUTOF10: 2
PAINLEVEL_OUTOF10: 8
PAINLEVEL_OUTOF10: 3

## 2023-07-03 ASSESSMENT — PAIN DESCRIPTION - DESCRIPTORS
DESCRIPTORS: CRAMPING

## 2023-07-03 NOTE — CARE COORDINATION
Chart reviewed - depression/anxiety. SW met with patient to complete initial assessment. Per patient, she has been on Celexa for 5 years, and this medication manages her moods well. Patient states that her depression/anxiety is \"well managed. \"  She denies any history of postpartum depression/anxiety. Patient will remain the Celexa postpartum. Patient given informational packet on  mood & anxiety disorders (resources/education). Family denies any additional needs from  at this time. Family has 's contact information should any needs/questions arise.     EDSON Claros-ANT, [address] [facility]   [phone]

## 2023-07-03 NOTE — LACTATION NOTE
This note was copied from a baby's chart. In to see mom and billy for the first time. Mom stated that she pumped with her first for 14 months. She plans to do the same with this infant. She is using her personal breast pump  and is presently expressing drops. She stated she has no concerns at this time. Lactation consultant will follow up as needed.

## 2023-07-04 VITALS
HEART RATE: 68 BPM | SYSTOLIC BLOOD PRESSURE: 110 MMHG | OXYGEN SATURATION: 99 % | DIASTOLIC BLOOD PRESSURE: 77 MMHG | TEMPERATURE: 98.2 F | RESPIRATION RATE: 16 BRPM

## 2023-07-04 PROCEDURE — 6370000000 HC RX 637 (ALT 250 FOR IP): Performed by: OBSTETRICS & GYNECOLOGY

## 2023-07-04 RX ORDER — HYDROCODONE BITARTRATE AND ACETAMINOPHEN 5; 325 MG/1; MG/1
1 TABLET ORAL EVERY 4 HOURS PRN
Status: DISCONTINUED | OUTPATIENT
Start: 2023-07-04 | End: 2023-07-04 | Stop reason: HOSPADM

## 2023-07-04 RX ORDER — IBUPROFEN 800 MG/1
800 TABLET ORAL
Qty: 60 TABLET | Refills: 0 | Status: SHIPPED | OUTPATIENT
Start: 2023-07-04

## 2023-07-04 RX ORDER — HYDROCODONE BITARTRATE AND ACETAMINOPHEN 5; 325 MG/1; MG/1
1 TABLET ORAL EVERY 4 HOURS PRN
Qty: 20 TABLET | Refills: 0 | Status: SHIPPED | OUTPATIENT
Start: 2023-07-04 | End: 2023-07-07

## 2023-07-04 RX ADMIN — IBUPROFEN 800 MG: 800 TABLET, FILM COATED ORAL at 11:39

## 2023-07-04 RX ADMIN — ACETAMINOPHEN 1000 MG: 500 TABLET, FILM COATED ORAL at 08:17

## 2023-07-04 RX ADMIN — POLYETHYLENE GLYCOL 3350 17 G: 17 POWDER, FOR SOLUTION ORAL at 08:17

## 2023-07-04 RX ADMIN — CITALOPRAM HYDROBROMIDE 20 MG: 20 TABLET ORAL at 08:17

## 2023-07-04 RX ADMIN — IBUPROFEN 800 MG: 800 TABLET, FILM COATED ORAL at 04:18

## 2023-07-04 NOTE — LACTATION NOTE
This note was copied from a baby's chart. In to see mom and infant for discharge. Mom plans to pump and bottle feed at home. Right now mom mostly formula feeding and some pumping. Encouraged to pump at least 8 x per day if trying to bring in strong supply for baby. Mom has pump to use at home. Reviewed discharge info, how to manage period of engorgement, breast milk storage rules and normalcy of volumes if pumping 8x per day first week of life. Mom has no needs or questions at this time.

## 2023-07-04 NOTE — DISCHARGE SUMMARY
Discharge Summary:     Date of Admission:  2023  6:34 AM  Date of Discharge:  2023      Patient is a 45 y.o. Nadeem Pérez at 39w0d wks who was admitted for  IOL due to A2DM, LGA baby, AMA. She had a  and a normal PP course. Patient remained afebrile throughout the entire hospital stay. On day of discharge, she was discharged home in good condition, meeting all postop goals with routine postpartum instructions. Pt to follow up in 6  weeks for pp visit at [facility] [place]. HTN diagnosis:  no     Discharge Meds:       Medication List        START taking these medications      HYDROcodone-acetaminophen 5-325 MG per tablet  Commonly known as: NORCO  Take 1 tablet by mouth every 4 hours as needed for Pain for up to 3 days.  Max Daily Amount: 6 tablets     ibuprofen 800 MG tablet  Commonly known as: ADVIL;MOTRIN  Take 1 tablet by mouth every 6-8 hours as needed for Pain            CONTINUE taking these medications      cetirizine 10 MG tablet  Commonly known as: ZYRTEC     citalopram 20 MG tablet  Commonly known as: CELEXA  TAKE 1 TABLET BY MOUTH DAILY     EXCEDRIN TENSION HEADACHE PO     Lancets Misc  1 each by Does not apply route daily     omeprazole 40 MG delayed release capsule  Commonly known as: PRILOSEC  Take 1 capsule by mouth daily     PRENATAL 1 PO            STOP taking these medications      blood glucose test strips     metFORMIN 500 MG extended release tablet  Commonly known as: GLUCOPHAGE-XR     ondansetron 4 MG disintegrating tablet  Commonly known as: ZOFRAN-ODT            ASK your doctor about these medications      acetaminophen 500 MG tablet  Commonly known as: TYLENOL               Where to Get Your Medications        These medications were sent to [address] [place] [address] TRAVELERS [facility], [place] -  [facility] - [facility] [phone]  [facility] [address]      Phone: [phone]   HYDROcodone-acetaminophen 5-325 MG per

## 2023-07-04 NOTE — PROGRESS NOTES
RN and pt discussed helping her get more rest tonight. RN and pt and agreed that RN would check again for baby's Q4VS at 3AM unless pt calls out for assistance.

## 2023-07-04 NOTE — LACTATION NOTE

## 2023-07-26 ENCOUNTER — TELEPHONE (OUTPATIENT)
Dept: OBGYN CLINIC | Age: 38
End: 2023-07-26

## 2023-07-26 DIAGNOSIS — N61.0 MASTITIS: Primary | ICD-10-CM

## 2023-07-26 RX ORDER — DICLOXACILLIN SODIUM 250 MG/1
500 CAPSULE ORAL 4 TIMES DAILY
Qty: 80 CAPSULE | Refills: 0 | Status: SHIPPED | OUTPATIENT
Start: 2023-07-26 | End: 2023-08-05

## 2023-07-26 NOTE — TELEPHONE ENCOUNTER
TC to pt. She c/o L sided breast pain, warm to touch, body chills, generally feeling unwell. Taking motrin, so no fever reported T 98.2. Breast feeding and pumping. Discussed rx, frequent nursing/pumping. Pt inst to call back to clinic if sx are worsening or not improved in 48 hrs after starting abx.

## 2023-07-26 NOTE — TELEPHONE ENCOUNTER
Patient called just had 3 wk pp visit, she currently breast feeds and thinks she has a clogged duct. She admits to going from hot to cold, thinks it could be a fever. Would like to know what to do next?

## 2023-08-07 ENCOUNTER — OFFICE VISIT (OUTPATIENT)
Dept: INTERNAL MEDICINE CLINIC | Facility: CLINIC | Age: 38
End: 2023-08-07
Payer: COMMERCIAL

## 2023-08-07 ENCOUNTER — NURSE TRIAGE (OUTPATIENT)
Dept: OTHER | Facility: CLINIC | Age: 38
End: 2023-08-07

## 2023-08-07 VITALS
BODY MASS INDEX: 30.46 KG/M2 | HEIGHT: 68 IN | TEMPERATURE: 98.8 F | WEIGHT: 201 LBS | HEART RATE: 97 BPM | DIASTOLIC BLOOD PRESSURE: 60 MMHG | SYSTOLIC BLOOD PRESSURE: 120 MMHG | OXYGEN SATURATION: 97 %

## 2023-08-07 DIAGNOSIS — N61.0: ICD-10-CM

## 2023-08-07 DIAGNOSIS — N61.0 MASTITIS, LEFT, ACUTE: Primary | ICD-10-CM

## 2023-08-07 LAB
ALBUMIN SERPL-MCNC: 3.8 G/DL (ref 3.5–5)
ALBUMIN/GLOB SERPL: 1 (ref 0.4–1.6)
ALP SERPL-CCNC: 134 U/L (ref 50–136)
ALT SERPL-CCNC: 22 U/L (ref 12–65)
ANION GAP SERPL CALC-SCNC: 8 MMOL/L (ref 2–11)
AST SERPL-CCNC: 13 U/L (ref 15–37)
BILIRUB SERPL-MCNC: 0.6 MG/DL (ref 0.2–1.1)
BUN SERPL-MCNC: 12 MG/DL (ref 6–23)
CALCIUM SERPL-MCNC: 9.4 MG/DL (ref 8.3–10.4)
CHLORIDE SERPL-SCNC: 107 MMOL/L (ref 101–110)
CO2 SERPL-SCNC: 25 MMOL/L (ref 21–32)
CREAT SERPL-MCNC: 0.8 MG/DL (ref 0.6–1)
ERYTHROCYTE [DISTWIDTH] IN BLOOD BY AUTOMATED COUNT: 14.3 % (ref 11.9–14.6)
GLOBULIN SER CALC-MCNC: 3.9 G/DL (ref 2.8–4.5)
GLUCOSE SERPL-MCNC: 82 MG/DL (ref 65–100)
HCT VFR BLD AUTO: 44.1 % (ref 35.8–46.3)
HGB BLD-MCNC: 13.2 G/DL (ref 11.7–15.4)
MCH RBC QN AUTO: 26.1 PG (ref 26.1–32.9)
MCHC RBC AUTO-ENTMCNC: 29.9 G/DL (ref 31.4–35)
MCV RBC AUTO: 87.3 FL (ref 82–102)
NRBC # BLD: 0 K/UL (ref 0–0.2)
PLATELET # BLD AUTO: 314 K/UL (ref 150–450)
PMV BLD AUTO: 9.8 FL (ref 9.4–12.3)
POTASSIUM SERPL-SCNC: 4.8 MMOL/L (ref 3.5–5.1)
PROT SERPL-MCNC: 7.7 G/DL (ref 6.3–8.2)
RBC # BLD AUTO: 5.05 M/UL (ref 4.05–5.2)
SODIUM SERPL-SCNC: 140 MMOL/L (ref 133–143)
WBC # BLD AUTO: 20.1 K/UL (ref 4.3–11.1)

## 2023-08-07 PROCEDURE — 99214 OFFICE O/P EST MOD 30 MIN: CPT | Performed by: NURSE PRACTITIONER

## 2023-08-07 RX ORDER — CLINDAMYCIN HYDROCHLORIDE 300 MG/1
300 CAPSULE ORAL 4 TIMES DAILY
Qty: 30 CAPSULE | Refills: 0 | Status: SHIPPED | OUTPATIENT
Start: 2023-08-07 | End: 2023-08-08 | Stop reason: SDUPTHER

## 2023-08-07 ASSESSMENT — ENCOUNTER SYMPTOMS: BREAST PAIN: 1

## 2023-08-07 NOTE — TELEPHONE ENCOUNTER
Location of patient: [place]    Received call from [address] at [facility] with [employer]. Subjective: Caller states \"today is the 10th day of my antibiotic. I did have a swollen red breast. They gave me atx. Last night it started hurting again and I got chills. I have a red spot again but it is on a different spot. This is on the left side. I know it produces 6 oz in the morning and today it was only 3oz. There are white beads when I am pumping. I tried hot compresses, hot showers. I tried sunflower. \"     Current Symptoms: left breast, red 2 inches long spot. Hot to the touch. Location: 2 o'clock to the nipple. No drainage. Onset: 1 day ago; sudden. worsening    Associated Symptoms: generalized joint pain, worse in hands. Pain Severity: 10+/10; sore; constant    Temperature: chills without fever. What has been tried: hot compression, hot shower, sunflower lecithin    LMP:  breastfeeding, post partum 5 weeks. Pregnant: No    Recommended disposition: See in Office Today    Care advice provided, patient verbalizes understanding; denies any other questions or concerns; instructed to call back for any new or worsening symptoms. Patient/Caller agrees with recommended disposition; writer provided warm transfer to [facility] at [facility] for appointment scheduling    Attention Provider: Thank you for allowing me to participate in the care of your patient. The patient was connected to triage in response to information provided to the ECC/PSC. Please do not respond through this encounter as the response is not directed to a shared pool.       Reason for Disposition   Breast looks infected (spreading redness, feels hot or painful to touch) and no fever    Protocols used: Breast Symptoms-ADULT-[place]

## 2023-08-07 NOTE — PROGRESS NOTES
Sandy Serrano (:  1985) is a 45 y.o. female,Established patient, here for evaluation of the following chief complaint(s):  Breast Problem (Left breast pain, joint pain. Nursing)         ASSESSMENT/PLAN:  1. Mastitis, left, acute  -     US BREAST COMPLETE LEFT; Future  -     CBC with Auto Differential; Future  -     Comprehensive Metabolic Panel; Future  2. Nonpurulent mastitis  -     US BREAST COMPLETE LEFT; Future  -     CBC with Auto Differential; Future  -     Comprehensive Metabolic Panel; Future      No follow-ups on file. Acute mastitis  Worsened on day 9 of abx  Get US of breast to r/o abscess  Check lab  Change abx to clindamycin  Discussed med risks/side effects - reviewed uptodate breastfeeding information on clinday  Take a probiotic and f/u if severe diarrhea  Would recommend she see the breastfeeding center through her pediatrician at [address] [place] [address].:  Patient is here for breast pain. She is on day 10 of antibiotic. She was feeling better until yesterday with fever, chills, body pain, and breast pain. She took last dicloxacillin today - last dose scheduled for tonight. Breast Problem  Chronicity:  Recurrent  Associated Symptoms: breast mass, breast pain, breast discharge, breast redness and swelling    Associated Symptoms: no skin change    Affected side:  Left  Onset:  Yesterday  Progression since onset:  Worsening  Currently pregnant:  No  Current birth control:  None  History of hormonal contraceptive use: no    History of hormone replacement therapy: no      Review of Systems       Objective   Physical Exam  Vitals reviewed. Constitutional:       Appearance: Normal appearance. She is obese. She is not ill-appearing. HENT:      Head: Normocephalic. Right Ear: External ear normal.      Left Ear: External ear normal.   Eyes:      Extraocular Movements: Extraocular movements intact.       Pupils: Pupils are equal, round,

## 2023-08-08 ENCOUNTER — TELEPHONE (OUTPATIENT)
Dept: INTERNAL MEDICINE CLINIC | Facility: CLINIC | Age: 38
End: 2023-08-08

## 2023-08-08 RX ORDER — CLINDAMYCIN HYDROCHLORIDE 300 MG/1
300 CAPSULE ORAL 4 TIMES DAILY
Qty: 40 CAPSULE | Refills: 0 | Status: SHIPPED | OUTPATIENT
Start: 2023-08-08 | End: 2023-08-18

## 2023-08-08 NOTE — TELEPHONE ENCOUNTER
Received called back - she is feeling much better this AM  She feels that one of the breast clogs opened up  No fever.    Take clindamycin get US as scheduled

## 2023-08-08 NOTE — TELEPHONE ENCOUNTER
[facility] called and states that they are needing clarification on the directions for the patients clindamycin (CLEOCIN) 300 MG capsule. Please Advise.

## 2023-08-10 ENCOUNTER — HOSPITAL ENCOUNTER (OUTPATIENT)
Dept: MAMMOGRAPHY | Age: 38
Discharge: HOME | End: 2023-08-10
Payer: COMMERCIAL

## 2023-08-10 DIAGNOSIS — N61.0 MASTITIS, LEFT, ACUTE: ICD-10-CM

## 2023-08-10 DIAGNOSIS — N61.0: ICD-10-CM

## 2023-08-10 PROCEDURE — 76642 ULTRASOUND BREAST LIMITED: CPT

## 2023-08-14 ENCOUNTER — POSTPARTUM VISIT (OUTPATIENT)
Dept: OBGYN CLINIC | Age: 38
End: 2023-08-14

## 2023-08-14 VITALS — WEIGHT: 201 LBS | SYSTOLIC BLOOD PRESSURE: 120 MMHG | DIASTOLIC BLOOD PRESSURE: 68 MMHG | BODY MASS INDEX: 30.56 KG/M2

## 2023-08-14 DIAGNOSIS — Z30.09 ENCOUNTER FOR COUNSELING REGARDING CONTRACEPTION: ICD-10-CM

## 2023-08-14 DIAGNOSIS — O24.419 GESTATIONAL DIABETES MELLITUS (GDM), ANTEPARTUM, GESTATIONAL DIABETES METHOD OF CONTROL UNSPECIFIED: ICD-10-CM

## 2023-08-14 PROCEDURE — 99902 PR PRENATAL VISIT: CPT | Performed by: OBSTETRICS & GYNECOLOGY

## 2023-08-14 RX ORDER — ACETAMINOPHEN AND CODEINE PHOSPHATE 120; 12 MG/5ML; MG/5ML
1 SOLUTION ORAL DAILY
Qty: 90 TABLET | Refills: 3 | Status: SHIPPED | OUTPATIENT
Start: 2023-08-14

## 2023-08-15 LAB
EST. AVERAGE GLUCOSE BLD GHB EST-MCNC: 123 MG/DL
HBA1C MFR BLD: 5.9 % (ref 4.8–5.6)

## 2024-03-21 RX ORDER — FLUTICASONE PROPIONATE 50 MCG
2 SPRAY, SUSPENSION (ML) NASAL DAILY
Qty: 16 G | Refills: 5 | Status: SHIPPED | OUTPATIENT
Start: 2024-03-21

## 2024-05-23 RX ORDER — ACETAMINOPHEN AND CODEINE PHOSPHATE 120; 12 MG/5ML; MG/5ML
1 SOLUTION ORAL DAILY
Qty: 84 TABLET | Refills: 0 | Status: SHIPPED | OUTPATIENT
Start: 2024-05-23

## 2024-07-02 ENCOUNTER — OFFICE VISIT (OUTPATIENT)
Dept: OBGYN CLINIC | Age: 39
End: 2024-07-02

## 2024-07-02 VITALS
SYSTOLIC BLOOD PRESSURE: 110 MMHG | BODY MASS INDEX: 29.7 KG/M2 | DIASTOLIC BLOOD PRESSURE: 62 MMHG | WEIGHT: 196 LBS | HEIGHT: 68 IN

## 2024-07-02 DIAGNOSIS — N89.8 VAGINAL DRYNESS: ICD-10-CM

## 2024-07-02 DIAGNOSIS — Z12.4 PAP SMEAR FOR CERVICAL CANCER SCREENING: ICD-10-CM

## 2024-07-02 DIAGNOSIS — E28.2 PCOS (POLYCYSTIC OVARIAN SYNDROME): ICD-10-CM

## 2024-07-02 DIAGNOSIS — Z01.419 WELL WOMAN EXAM WITH ROUTINE GYNECOLOGICAL EXAM: Primary | ICD-10-CM

## 2024-07-02 DIAGNOSIS — Z11.51 SCREENING FOR HUMAN PAPILLOMAVIRUS (HPV): ICD-10-CM

## 2024-07-02 DIAGNOSIS — R10.2 PELVIC PRESSURE IN FEMALE: ICD-10-CM

## 2024-07-02 DIAGNOSIS — R10.2 PELVIC PAIN: ICD-10-CM

## 2024-07-02 DIAGNOSIS — Z12.39 ENCOUNTER FOR SCREENING FOR MALIGNANT NEOPLASM OF BREAST, UNSPECIFIED SCREENING MODALITY: ICD-10-CM

## 2024-07-02 DIAGNOSIS — N94.10 DYSPAREUNIA IN FEMALE: ICD-10-CM

## 2024-07-02 RX ORDER — PROGESTERONE 200 MG/1
CAPSULE ORAL
Qty: 60 CAPSULE | Refills: 3 | Status: SHIPPED | OUTPATIENT
Start: 2024-07-02

## 2024-07-02 RX ORDER — ESTRADIOL 0.1 MG/G
CREAM VAGINAL
Qty: 42.5 G | Refills: 3 | Status: SHIPPED | OUTPATIENT
Start: 2024-07-02

## 2024-07-02 NOTE — PROGRESS NOTES
CC:  Annual GYN exam    HPI:  39 y.o.   presents today for a routine gynecological examination.  No LMP recorded. (Menstrual status: Irregular periods)..     PCP:  TANYA Cage     Contraception:   w/ Vasectomy    Just DC'd the Micronor OCPs 2wks ago   No longer pumping  -- finished in May        Has had 1 true period since finishing pumping/DCing the [employer]ron[employer] (previously w/  occasional spotting only while breastfeeding). Reports her period was heavier and more painful than typical.      +hx PCOS w/ anovulatory VB, hx GDM:  Declines starting anything daily for menstrual control for now; states wants to see what her new baseline is now.  Counseled appropriately          Today c/o vaginal pain the day AFTER sex (not during) and occasional postcoital spotting   +vaginal dryness    Tried OTC lube with minimal help   Occasionally w/ a dull ache or gas or pulling sensation in lower abd w/ some pressure/fullness    No GI/ sxs otherwise          Sexually active w/  alone  Desires STD testing:  No         Hx A2DM, BMI 30, Anxiety/Depression:   Screening labs and mgmnt by pcp           Ob hx:  G1  21 w/ TRINA at 39w6d (6#14); c/b A1DM, obesity, AMA, Dep/Anx (\"[place]\")  G2 2022 early SAB  G2  23 at 39w0d with Dr Gallardo after IOL due to A2DM on Metformin, LGA baby (\"[place]\")        GYN HISTORY:  As per HPI     Last Pap:  22--Neg cotesting and std testing   Hx of Abnl Paps: None     Hx STDs: None         OB History          3    Para   2    Term   2       0    AB   1    Living   2         SAB   1    IAB        Ectopic        Molar        Multiple   0    Live Births   2          Obstetric Comments     Ob hx:  G1  21 w/ TRINA at 39w6d (6#14); c/b A1DM, obesity, AMA, Dep/Anx (\"[place]\")  G2 2022 early SAB  G2  23 at 39w0d with Dr Gallardo after IOL due to A2DM on Metformin, LGA baby (\"[place]\")                 Past Medical History:   Diagnosis Date

## 2024-07-08 DIAGNOSIS — F41.9 ANXIETY DISORDER, UNSPECIFIED TYPE: ICD-10-CM

## 2024-07-08 DIAGNOSIS — F43.22 ACUTE ADJUSTMENT DISORDER WITH ANXIETY: ICD-10-CM

## 2024-07-08 DIAGNOSIS — Z86.32 HISTORY OF GESTATIONAL DIABETES: ICD-10-CM

## 2024-07-08 DIAGNOSIS — R53.83 OTHER FATIGUE: ICD-10-CM

## 2024-07-09 LAB
COLLECTION METHOD: NORMAL
CYTOLOGIST CVX/VAG CYTO: NORMAL
CYTOLOGY CVX/VAG DOC THIN PREP: NORMAL
DATE OF LMP: NORMAL
HPV APTIMA: NEGATIVE
Lab: NORMAL
Lab: NORMAL
OTHER PT INFO: NORMAL
PAP SOURCE: NORMAL
PATH REPORT.FINAL DX SPEC: NORMAL
PREV CYTO INFO: NORMAL
PREV TREATMENT RESULTS: NEGATIVE
PREV TREATMENT: NORMAL
STAT OF ADQ CVX/VAG CYTO-IMP: NORMAL

## 2024-07-09 RX ORDER — LORAZEPAM 0.5 MG/1
TABLET ORAL
Qty: 30 TABLET | OUTPATIENT
Start: 2024-07-09

## 2024-07-26 RX ORDER — ACETAMINOPHEN AND CODEINE PHOSPHATE 120; 12 MG/5ML; MG/5ML
1 SOLUTION ORAL DAILY
Qty: 84 TABLET | Refills: 0 | OUTPATIENT
Start: 2024-07-26

## 2024-08-20 ENCOUNTER — TELEMEDICINE (OUTPATIENT)
Dept: INTERNAL MEDICINE CLINIC | Facility: CLINIC | Age: 39
End: 2024-08-20
Payer: COMMERCIAL

## 2024-08-20 DIAGNOSIS — F41.9 ANXIETY DISORDER, UNSPECIFIED TYPE: ICD-10-CM

## 2024-08-20 DIAGNOSIS — J32.9 RECURRENT SINUSITIS: Primary | ICD-10-CM

## 2024-08-20 PROCEDURE — 99214 OFFICE O/P EST MOD 30 MIN: CPT | Performed by: NURSE PRACTITIONER

## 2024-08-20 RX ORDER — AMOXICILLIN AND CLAVULANATE POTASSIUM 875; 125 MG/1; MG/1
1 TABLET, FILM COATED ORAL 2 TIMES DAILY
Qty: 14 TABLET | Refills: 0 | Status: SHIPPED | OUTPATIENT
Start: 2024-08-20 | End: 2024-08-27

## 2024-08-20 RX ORDER — LORAZEPAM 0.5 MG/1
0.5 TABLET ORAL NIGHTLY PRN
Qty: 30 TABLET | Refills: 0 | Status: SHIPPED | OUTPATIENT
Start: 2024-08-20 | End: 2024-09-19

## 2024-08-20 SDOH — ECONOMIC STABILITY: FOOD INSECURITY: WITHIN THE PAST 12 MONTHS, THE FOOD YOU BOUGHT JUST DIDN'T LAST AND YOU DIDN'T HAVE MONEY TO GET MORE.: NEVER TRUE

## 2024-08-20 SDOH — ECONOMIC STABILITY: TRANSPORTATION INSECURITY
IN THE PAST 12 MONTHS, HAS LACK OF TRANSPORTATION KEPT YOU FROM MEETINGS, WORK, OR FROM GETTING THINGS NEEDED FOR DAILY LIVING?: NO

## 2024-08-20 SDOH — ECONOMIC STABILITY: FOOD INSECURITY: WITHIN THE PAST 12 MONTHS, YOU WORRIED THAT YOUR FOOD WOULD RUN OUT BEFORE YOU GOT MONEY TO BUY MORE.: NEVER TRUE

## 2024-08-20 SDOH — ECONOMIC STABILITY: INCOME INSECURITY: HOW HARD IS IT FOR YOU TO PAY FOR THE VERY BASICS LIKE FOOD, HOUSING, MEDICAL CARE, AND HEATING?: NOT HARD AT ALL

## 2024-08-20 ASSESSMENT — PATIENT HEALTH QUESTIONNAIRE - PHQ9
2. FEELING DOWN, DEPRESSED OR HOPELESS: NOT AT ALL
9. THOUGHTS THAT YOU WOULD BE BETTER OFF DEAD, OR OF HURTING YOURSELF: NOT AT ALL
7. TROUBLE CONCENTRATING ON THINGS, SUCH AS READING THE NEWSPAPER OR WATCHING TELEVISION: NEARLY EVERY DAY
SUM OF ALL RESPONSES TO PHQ QUESTIONS 1-9: 0
1. LITTLE INTEREST OR PLEASURE IN DOING THINGS: NOT AT ALL
4. FEELING TIRED OR HAVING LITTLE ENERGY: NOT AT ALL
8. MOVING OR SPEAKING SO SLOWLY THAT OTHER PEOPLE COULD HAVE NOTICED. OR THE OPPOSITE - BEING SO FIDGETY OR RESTLESS THAT YOU HAVE BEEN MOVING AROUND A LOT MORE THAN USUAL: NOT AT ALL
SUM OF ALL RESPONSES TO PHQ QUESTIONS 1-9: 0
SUM OF ALL RESPONSES TO PHQ QUESTIONS 1-9: 3
2. FEELING DOWN, DEPRESSED OR HOPELESS: NOT AT ALL
1. LITTLE INTEREST OR PLEASURE IN DOING THINGS: NOT AT ALL
SUM OF ALL RESPONSES TO PHQ9 QUESTIONS 1 & 2: 0
10. IF YOU CHECKED OFF ANY PROBLEMS, HOW DIFFICULT HAVE THESE PROBLEMS MADE IT FOR YOU TO DO YOUR WORK, TAKE CARE OF THINGS AT HOME, OR GET ALONG WITH OTHER PEOPLE: NOT DIFFICULT AT ALL
3. TROUBLE FALLING OR STAYING ASLEEP: NOT AT ALL
10. IF YOU CHECKED OFF ANY PROBLEMS, HOW DIFFICULT HAVE THESE PROBLEMS MADE IT FOR YOU TO DO YOUR WORK, TAKE CARE OF THINGS AT HOME, OR GET ALONG WITH OTHER PEOPLE: NOT DIFFICULT AT ALL
SUM OF ALL RESPONSES TO PHQ QUESTIONS 1-9: 3
6. FEELING BAD ABOUT YOURSELF - OR THAT YOU ARE A FAILURE OR HAVE LET YOURSELF OR YOUR FAMILY DOWN: NOT AT ALL
4. FEELING TIRED OR HAVING LITTLE ENERGY: NOT AT ALL
SUM OF ALL RESPONSES TO PHQ QUESTIONS 1-9: 3
6. FEELING BAD ABOUT YOURSELF - OR THAT YOU ARE A FAILURE OR HAVE LET YOURSELF OR YOUR FAMILY DOWN: NOT AT ALL
5. POOR APPETITE OR OVEREATING: NOT AT ALL
SUM OF ALL RESPONSES TO PHQ9 QUESTIONS 1 & 2: 0
1. LITTLE INTEREST OR PLEASURE IN DOING THINGS: NOT AT ALL
5. POOR APPETITE OR OVEREATING: NOT AT ALL
SUM OF ALL RESPONSES TO PHQ QUESTIONS 1-9: 0
8. MOVING OR SPEAKING SO SLOWLY THAT OTHER PEOPLE COULD HAVE NOTICED. OR THE OPPOSITE, BEING SO FIGETY OR RESTLESS THAT YOU HAVE BEEN MOVING AROUND A LOT MORE THAN USUAL: NOT AT ALL
SUM OF ALL RESPONSES TO PHQ QUESTIONS 1-9: 0
2. FEELING DOWN, DEPRESSED OR HOPELESS: NOT AT ALL
9. THOUGHTS THAT YOU WOULD BE BETTER OFF DEAD, OR OF HURTING YOURSELF: NOT AT ALL
3. TROUBLE FALLING OR STAYING ASLEEP: NOT AT ALL
SUM OF ALL RESPONSES TO PHQ QUESTIONS 1-9: 3
7. TROUBLE CONCENTRATING ON THINGS, SUCH AS READING THE NEWSPAPER OR WATCHING TELEVISION: NEARLY EVERY DAY
SUM OF ALL RESPONSES TO PHQ QUESTIONS 1-9: 3

## 2024-08-20 NOTE — PROGRESS NOTES
Objective   Patient-Reported Vitals  Patient-Reported Weight: 196lb  Patient-Reported Height: 5'8\"       Physical Exam  [INSTRUCTIONS:  \"[x]\" Indicates a positive item  \"[]\" Indicates a negative item  -- DELETE ALL ITEMS NOT EXAMINED]    Constitutional: [x] Appears well-developed and well-nourished [x] No apparent distress      [] Abnormal -     Mental status: [x] Alert and awake  [x] Oriented to person/place/time [x] Able to follow commands    [] Abnormal -     Eyes:   EOM    [x]  Normal    [] Abnormal -   Sclera  [x]  Normal    [] Abnormal -          Discharge [x]  None visible   [] Abnormal -     HENT: [x] Normocephalic, atraumatic  [] Abnormal -   [x] Mouth/Throat: Mucous membranes are moist    External Ears [x] Normal  [] Abnormal -    Neck: [x] No visualized mass [] Abnormal -     Pulmonary/Chest: [x] Respiratory effort normal   [x] No visualized signs of difficulty breathing or respiratory distress        [] Abnormal -      Musculoskeletal:   [x] Normal gait with no signs of ataxia         [x] Normal range of motion of neck        [] Abnormal -     Neurological:        [x] No Facial Asymmetry (Cranial nerve 7 motor function) (limited exam due to video visit)          [x] No gaze palsy        [] Abnormal -          Skin:        [x] No significant exanthematous lesions or discoloration noted on facial skin         [] Abnormal -            Psychiatric:       [x] Normal Affect [] Abnormal -        [x] No Hallucinations    Other pertinent observable physical exam findings:-         --Aury Cage, APRN - CNP

## 2024-09-05 ENCOUNTER — OFFICE VISIT (OUTPATIENT)
Dept: ENT CLINIC | Age: 39
End: 2024-09-05
Payer: COMMERCIAL

## 2024-09-05 VITALS
HEIGHT: 68 IN | BODY MASS INDEX: 29.77 KG/M2 | DIASTOLIC BLOOD PRESSURE: 78 MMHG | SYSTOLIC BLOOD PRESSURE: 112 MMHG | WEIGHT: 196.4 LBS

## 2024-09-05 DIAGNOSIS — J34.2 NASAL SEPTAL DEVIATION: Chronic | ICD-10-CM

## 2024-09-05 DIAGNOSIS — J34.3 HYPERTROPHY OF INFERIOR NASAL TURBINATE: Primary | Chronic | ICD-10-CM

## 2024-09-05 PROCEDURE — 99204 OFFICE O/P NEW MOD 45 MIN: CPT | Performed by: PHYSICIAN ASSISTANT

## 2024-09-05 PROCEDURE — 31231 NASAL ENDOSCOPY DX: CPT | Performed by: PHYSICIAN ASSISTANT

## 2024-09-05 RX ORDER — AZELASTINE 1 MG/ML
2 SPRAY, METERED NASAL 2 TIMES DAILY
Qty: 120 ML | Refills: 1 | Status: SHIPPED | OUTPATIENT
Start: 2024-09-05

## 2024-09-05 ASSESSMENT — ENCOUNTER SYMPTOMS
SINUS PAIN: 0
GASTROINTESTINAL NEGATIVE: 1
ALLERGIC/IMMUNOLOGIC NEGATIVE: 1
RESPIRATORY NEGATIVE: 1
SINUS PRESSURE: 0
EYES NEGATIVE: 1

## 2024-09-05 NOTE — PATIENT INSTRUCTIONS
Hydration:   Saline spray:  Brands like Aquaphor or AYR may be used as needed throughout the day to keep moisture in the nose. I recommend morning and evening.  This will prevent rebleeding and keep moisture in.   Aquaphor ointment:  This is an option to keep the inside of the nose hydrated.  It will prevent rebleeding and may be used before bed and/or in the mornings.  Humidification : vaporizer or humidifier in the bedroom, especially in dry weather.   Saline Rinse: twice daily NeilMed sinus rinse. Helpful to remember luke warm water, keep your mouth open, and lean forward when rinsing. Do not force the rinse up but apply steady pressure to the bottle. Repeat as often as necessary.     I will call Astelin into your pharmacy.
